# Patient Record
Sex: FEMALE | Race: BLACK OR AFRICAN AMERICAN | NOT HISPANIC OR LATINO | Employment: PART TIME | URBAN - METROPOLITAN AREA
[De-identification: names, ages, dates, MRNs, and addresses within clinical notes are randomized per-mention and may not be internally consistent; named-entity substitution may affect disease eponyms.]

---

## 2018-08-02 ENCOUNTER — HOSPITAL ENCOUNTER (EMERGENCY)
Facility: HOSPITAL | Age: 33
Discharge: HOME | End: 2018-08-03
Attending: EMERGENCY MEDICINE | Admitting: EMERGENCY MEDICINE
Payer: OTHER MISCELLANEOUS

## 2018-08-02 DIAGNOSIS — T14.8XXA ABRASION: Primary | ICD-10-CM

## 2018-08-02 LAB — ALT SERPL W P-5'-P-CCNC: 16 U/L (ref 12–78)

## 2018-08-02 PROCEDURE — 86803 HEPATITIS C AB TEST: CPT | Performed by: EMERGENCY MEDICINE

## 2018-08-02 PROCEDURE — 87389 HIV-1 AG W/HIV-1&-2 AB AG IA: CPT | Performed by: EMERGENCY MEDICINE

## 2018-08-02 PROCEDURE — 87340 HEPATITIS B SURFACE AG IA: CPT | Performed by: EMERGENCY MEDICINE

## 2018-08-02 PROCEDURE — 86706 HEP B SURFACE ANTIBODY: CPT | Performed by: EMERGENCY MEDICINE

## 2018-08-02 PROCEDURE — 36415 COLL VENOUS BLD VENIPUNCTURE: CPT | Performed by: EMERGENCY MEDICINE

## 2018-08-02 PROCEDURE — 84460 ALANINE AMINO (ALT) (SGPT): CPT | Performed by: EMERGENCY MEDICINE

## 2018-08-02 RX ORDER — EMTRICITABINE AND TENOFOVIR DISOPROXIL FUMARATE 200; 300 MG/1; MG/1
1 TABLET, FILM COATED ORAL DAILY
Qty: 10 TABLET | Refills: 0 | Status: SHIPPED | OUTPATIENT
Start: 2018-08-02 | End: 2019-04-01 | Stop reason: ALTCHOICE

## 2018-08-02 RX ADMIN — RALTEGRAVIR 400 MG: 400 TABLET, FILM COATED ORAL at 23:30

## 2018-08-03 ENCOUNTER — APPOINTMENT (OUTPATIENT)
Dept: LAB | Facility: HOSPITAL | Age: 33
End: 2018-08-03
Attending: PREVENTIVE MEDICINE
Payer: OTHER MISCELLANEOUS

## 2018-08-03 ENCOUNTER — TRANSCRIBE ORDERS (OUTPATIENT)
Dept: OCCUPATIONAL MEDICINE | Facility: CLINIC | Age: 33
End: 2018-08-03

## 2018-08-03 VITALS
TEMPERATURE: 98.1 F | OXYGEN SATURATION: 99 % | DIASTOLIC BLOOD PRESSURE: 82 MMHG | BODY MASS INDEX: 26.99 KG/M2 | SYSTOLIC BLOOD PRESSURE: 131 MMHG | RESPIRATION RATE: 20 BRPM | HEART RATE: 70 BPM | WEIGHT: 162 LBS | HEIGHT: 65 IN

## 2018-08-03 DIAGNOSIS — Z77.29: Primary | ICD-10-CM

## 2018-08-03 DIAGNOSIS — Z77.29: ICD-10-CM

## 2018-08-03 LAB
ALBUMIN SERPL BCP-MCNC: 3.9 G/DL (ref 3.5–5)
ALP SERPL-CCNC: 105 U/L (ref 46–116)
ALT SERPL W P-5'-P-CCNC: 16 U/L (ref 12–78)
AST SERPL W P-5'-P-CCNC: 14 U/L (ref 5–45)
BASOPHILS # BLD AUTO: 0.08 THOUSANDS/ΜL (ref 0–0.1)
BASOPHILS NFR BLD AUTO: 1 % (ref 0–1)
BILIRUB DIRECT SERPL-MCNC: 0.11 MG/DL (ref 0–0.2)
BILIRUB SERPL-MCNC: 0.4 MG/DL (ref 0.2–1)
BUN SERPL-MCNC: 9 MG/DL (ref 5–25)
CREAT SERPL-MCNC: 0.93 MG/DL (ref 0.6–1.3)
EOSINOPHIL # BLD AUTO: 0.2 THOUSAND/ΜL (ref 0–0.61)
EOSINOPHIL NFR BLD AUTO: 2 % (ref 0–6)
ERYTHROCYTE [DISTWIDTH] IN BLOOD BY AUTOMATED COUNT: 13.3 % (ref 11.6–15.1)
GFR SERPL CREATININE-BSD FRML MDRD: 93 ML/MIN/1.73SQ M
HBV SURFACE AB SER-ACNC: >1000 MIU/ML
HBV SURFACE AG SER QL: NORMAL
HCT VFR BLD AUTO: 40.2 % (ref 34.8–46.1)
HCV AB SER QL: NORMAL
HGB BLD-MCNC: 13.2 G/DL (ref 11.5–15.4)
HIV 1+2 AB+HIV1 P24 AG SERPL QL IA: NORMAL
IMM GRANULOCYTES # BLD AUTO: 0.01 THOUSAND/UL (ref 0–0.2)
IMM GRANULOCYTES NFR BLD AUTO: 0 % (ref 0–2)
LYMPHOCYTES # BLD AUTO: 3.56 THOUSANDS/ΜL (ref 0.6–4.47)
LYMPHOCYTES NFR BLD AUTO: 42 % (ref 14–44)
MCH RBC QN AUTO: 29.7 PG (ref 26.8–34.3)
MCHC RBC AUTO-ENTMCNC: 32.8 G/DL (ref 31.4–37.4)
MCV RBC AUTO: 91 FL (ref 82–98)
MONOCYTES # BLD AUTO: 0.85 THOUSAND/ΜL (ref 0.17–1.22)
MONOCYTES NFR BLD AUTO: 10 % (ref 4–12)
NEUTROPHILS # BLD AUTO: 3.79 THOUSANDS/ΜL (ref 1.85–7.62)
NEUTS SEG NFR BLD AUTO: 45 % (ref 43–75)
NRBC BLD AUTO-RTO: 0 /100 WBCS
PLATELET # BLD AUTO: 285 THOUSANDS/UL (ref 149–390)
PMV BLD AUTO: 10.4 FL (ref 8.9–12.7)
PROT SERPL-MCNC: 7.5 G/DL (ref 6.4–8.2)
RBC # BLD AUTO: 4.44 MILLION/UL (ref 3.81–5.12)
WBC # BLD AUTO: 8.49 THOUSAND/UL (ref 4.31–10.16)

## 2018-08-03 PROCEDURE — 80076 HEPATIC FUNCTION PANEL: CPT

## 2018-08-03 PROCEDURE — 82565 ASSAY OF CREATININE: CPT

## 2018-08-03 PROCEDURE — 84520 ASSAY OF UREA NITROGEN: CPT

## 2018-08-03 PROCEDURE — 99283 EMERGENCY DEPT VISIT LOW MDM: CPT

## 2018-08-03 PROCEDURE — 85025 COMPLETE CBC W/AUTO DIFF WBC: CPT

## 2018-08-03 PROCEDURE — 36415 COLL VENOUS BLD VENIPUNCTURE: CPT

## 2018-08-03 NOTE — ED PROVIDER NOTES
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, the customer is HIV positive     HPI    58-year-old female that presents today with body fluid exposure  Patient states she has a from assistant was giving medication to the customer which she knew was HIV positive  The customer scratch are in her right thumb she has a tiny scratch  States she is scared of getting HIV from the patient  Patient denies any symptoms  States she is fully vaccinated hepatitis is up-to-date tetanus is up-to-date  I discussed with patient this is a low risk nonsignificant exposure the patient is would like post exposure prophylaxis regimen for HIV because she knows the customer was HIV positive  I discussed about the risks I discussed about safe sexual activity  Discussed about symptoms  Discussed about breast-feeding contact here physician about discontinuing  Will have patient exposure panel checked right medications for 10 days and advised to follow up with occupational health    None       History reviewed  No pertinent past medical history  Past Surgical History:   Procedure Laterality Date    HYSTERECTOMY         History reviewed  No pertinent family history  I have reviewed and agree with the history as documented  Social History   Substance Use Topics    Smoking status: Never Smoker    Smokeless tobacco: Never Used    Alcohol use No        Review of Systems   Constitutional: Negative  Negative for diaphoresis and fever  HENT: Negative  Respiratory: Negative  Negative for cough, shortness of breath and wheezing  Cardiovascular: Negative  Negative for chest pain, palpitations and leg swelling  Gastrointestinal: Negative for abdominal distention, abdominal pain, nausea and vomiting  Genitourinary: Negative  Musculoskeletal: Negative  Skin: Negative  Neurological: Negative  Psychiatric/Behavioral: Negative  All other systems reviewed and are negative  Physical Exam  Physical Exam   Constitutional: She is oriented to person, place, and time  She appears well-developed and well-nourished  No distress  HENT:   Head: Normocephalic and atraumatic  Eyes: EOM are normal  Pupils are equal, round, and reactive to light  Neck: Normal range of motion  Neck supple  Cardiovascular: Normal rate, regular rhythm and normal heart sounds  No murmur heard  Pulmonary/Chest: Effort normal and breath sounds normal  No respiratory distress  Abdominal: Soft  Bowel sounds are normal  She exhibits no distension  There is no tenderness  There is no rebound and no guarding  Musculoskeletal: Normal range of motion  She exhibits no edema, tenderness or deformity  Superficial scratch to right thumb without any bleeding   Neurological: She is alert and oriented to person, place, and time  Skin: Skin is warm and dry  Capillary refill takes less than 2 seconds  No rash noted  She is not diaphoretic  Psychiatric: She has a normal mood and affect  Vitals reviewed        Vital Signs  ED Triage Vitals [08/02/18 2247]   Temperature Pulse Respirations Blood Pressure SpO2   98 1 °F (36 7 °C) 70 16 132/78 100 %      Temp Source Heart Rate Source Patient Position - Orthostatic VS BP Location FiO2 (%)   Oral Monitor Sitting Right arm --      Pain Score       --           Vitals:    08/02/18 2247 08/03/18 0010   BP: 132/78 131/82   Pulse: 70 70   Patient Position - Orthostatic VS: Sitting Sitting       Visual Acuity      ED Medications  Medications   emtricitabine-tenofovir (TRUVADA 200-300) combo dose (not administered)   raltegravir (ISENTRESS) tablet 400 mg (400 mg Oral Given 8/2/18 2330)       Diagnostic Studies  Results Reviewed     Procedure Component Value Units Date/Time    ALT [[id number]]  (Normal) Collected:  08/02/18 2322    Lab Status:  Final result Specimen: Blood from Arm, Right Updated:  08/02/18 2344     ALT 16 U/L     HIV 1/2 AG-AB combo [[id number]] Collected:  08/02/18 2322    Lab Status: In process Specimen:  Blood from Arm, Right Updated:  08/02/18 2329    Hepatitis B surface antigen [[id number]] Collected:  08/02/18 2322    Lab Status: In process Specimen:  Blood from Arm, Right Updated:  08/02/18 2329    Hepatitis C antibody [[id number]] Collected:  08/02/18 2322    Lab Status: In process Specimen:  Blood from Arm, Right Updated:  08/02/18 2329    Hepatitis B surface antibody [[id number]] Collected:  08/02/18 2322    Lab Status: In process Specimen:  Blood from Arm, Right Updated:  08/02/18 2329    POCT pregnancy, urine [[id number]]     Lab Status:  No result                  No orders to display              Procedures  Procedures       Phone Contacts  ED Phone Contact    ED Course  ED Course as of Aug 03 0055   Thu Aug 02, 2018   2348 Patient had a hysterectomy                                 MDM  CritCare Time    Disposition  Final diagnoses:   Abrasion     Time reflects when diagnosis was documented in both MDM as applicable and the Disposition within this note     Time User Action Codes Description Comment    8/2/2018 11:51 PM Lonza [place], [address] [place] [address] Road  8XXA] Abrasion       ED Disposition     ED Disposition Condition Comment    Discharge  [facility] discharge to home/self care      Condition at discharge: Stable        Follow-up Information     Follow up With Specialties Details Why Contact Info Additional Information    [address] [place] [address] Occupational Medicine Schedule an appointment as soon as possible for a visit  [address] [facility] [address] [place] [address]          Discharge Medication List as of 8/2/2018 11:55 PM      START taking these medications    Details   emtricitabine-tenofovir (TRUVADA) 200-300 mg per tablet Take 1 tablet by mouth daily for 10 days, Starting Thu 8/2/2018, Until Sun 8/12/2018, Print      raltegravir (ISENTRESS) 400 mg tablet Take 1 tablet (400 mg total) by mouth every 12 (twelve) hours for 10 days, Starting Thu 8/2/2018, Until Sun 8/12/2018, Print           No discharge procedures on file      ED Provider  Electronically Signed by           Erin Servin MD  08/03/18 9932

## 2018-08-03 NOTE — DISCHARGE INSTRUCTIONS
I discussed with you the risks of the medications  Advise safe sex  followup with Occupational Medicine  No breastfeeding  Do not donate blood or blood products  Monitor for fevers, rash, tiredness, swollen lymph nodes, flu-like symptoms, loss of appetite, nausea vomiting, stomach pains, yellowing of scan during the 6 months follow-up  Raltegravir (By mouth)   Raltegravir (fyn-ZNB-fm-vir)  Treats HIV infection  Brand Name(s): Isentress   There may be other brand names for this medicine  When This Medicine Should Not Be Used: This medicine is not right for everyone  Do not use if you had an allergic reaction to raltegravir  How to Use This Medicine:   Powder for Suspension, Tablet, Chewable Tablet  · Your doctor will tell you how much medicine to use  Do not use more than directed  · Chewable tablet: You may chew or swallow the tablet whole  · Film-coated tablet: Swallow the film-coated tablet whole  · Suspension:  ¨ Dissolve one single-use packet in 5 milliliters (mL) of water  ¨ Mix the medicine by swirling the mixing cup carefully and slowly in a [place]  Do not turn the cup upside down  ¨ Use the dosing syringe that comes with the package to measure the right dose  ¨ Take the dose within 30 minutes after you mix the liquid medicine  ¨ Throw away any remaining liquid medicine  · Do not change the dose or stop using this medicine without talking to your doctor first  Contact your doctor or pharmacist for a refill when your supply of this medicine is running low  · Read and follow the patient instructions that come with this medicine  Talk to your doctor or pharmacist if you have any questions  · Missed dose: Take a dose as soon as you remember  If it is almost time for your next dose, wait until then and take a regular dose  Do not take extra medicine to make up for a missed dose  · Store the medicine in a closed container at room temperature, away from heat, moisture, and direct light   Store the chewable tablets in the original package with the bottle tightly closed  Do not open the foil packet of powder for suspension until you are ready to use it  Drugs and Foods to Avoid:   Ask your doctor or pharmacist before using any other medicine, including over-the-counter medicines, vitamins, and herbal products  · Some medicines can affect how raltegravir works  Tell your doctor if you also use any of the following:  ¨ Rifampin  ¨ Zidovudine  ¨ Medicine to lower cholesterol  · Do not take this medicine together with an antacid that contains aluminum or magnesium  Wait at least 2 hours before or after you take raltegravir to take an antacid  Warnings While Using This Medicine:   · Tell your doctor if you are pregnant, or if you have liver disease, an infection, phenylketonuria (PKU), or a history of myopathy or rhabdomyolysis  · Do not breastfeed  You can spread HIV or AIDS to your baby through your breast milk  · This medicine may cause the following problems:   ¨ Severe skin or allergic reactions (may be life-threatening)  ¨ Myopathy  ¨ Rhabdomyolysis (which can cause kidney problems)  · Your immune system may get stronger when you start taking HIV medicine  Sometimes the immune system will start to fight an infection or other health problem, such as pneumonia or tuberculosis  Tell your doctor right away if you notice any changes in your health  · This medicine will not keep you from giving HIV to others  Always practice safe sex, even if your partner also has HIV  Do not share needles or other items that may have blood or body fluids on them  · Your doctor will do lab tests at regular visits to check on the effects of this medicine  Keep all appointments  · Keep all medicine out of the reach of children  Never share your medicine with anyone    Possible Side Effects While Using This Medicine:   Call your doctor right away if you notice any of these side effects:  · Allergic reaction: Itching or hives, swelling in your face or hands, swelling or tingling in your mouth or throat, chest tightness, trouble breathing  · Blistering, peeling, or severe skin rash  · Dark urine or pale stools, nausea, vomiting, loss of appetite, stomach pain, yellow skin or eyes  · Fever, chills, cough, sore throat, or joint pain  · Muscle pain, tenderness, or weakness  If you notice other side effects that you think are caused by this medicine, tell your doctor  Call your doctor for medical advice about side effects  You may report side effects to FDA at 3-417-FDA-6681  © 2017 [address] [employer] Information is for End User's use only and may not be sold, redistributed or otherwise used for commercial purposes  The above information is an  only  It is not intended as medical advice for individual conditions or treatments  Talk to your doctor, nurse or pharmacist before following any medical regimen to see if it is safe and effective for you

## 2018-08-10 ENCOUNTER — TRANSCRIBE ORDERS (OUTPATIENT)
Dept: OCCUPATIONAL MEDICINE | Facility: CLINIC | Age: 33
End: 2018-08-10

## 2018-08-10 DIAGNOSIS — Z77.29: Primary | ICD-10-CM

## 2018-08-28 ENCOUNTER — APPOINTMENT (OUTPATIENT)
Dept: LAB | Facility: HOSPITAL | Age: 33
End: 2018-08-28
Attending: PREVENTIVE MEDICINE

## 2018-08-28 DIAGNOSIS — Z77.29: ICD-10-CM

## 2018-08-28 LAB
ALBUMIN SERPL BCP-MCNC: 3.7 G/DL (ref 3.5–5)
ALP SERPL-CCNC: 103 U/L (ref 46–116)
ALT SERPL W P-5'-P-CCNC: 24 U/L (ref 12–78)
AST SERPL W P-5'-P-CCNC: 16 U/L (ref 5–45)
BASOPHILS # BLD AUTO: 0.09 THOUSANDS/ΜL (ref 0–0.1)
BASOPHILS NFR BLD AUTO: 1 % (ref 0–1)
BILIRUB DIRECT SERPL-MCNC: 0.09 MG/DL (ref 0–0.2)
BILIRUB SERPL-MCNC: 0.4 MG/DL (ref 0.2–1)
BUN SERPL-MCNC: 18 MG/DL (ref 5–25)
CREAT SERPL-MCNC: 1.03 MG/DL (ref 0.6–1.3)
EOSINOPHIL # BLD AUTO: 0.15 THOUSAND/ΜL (ref 0–0.61)
EOSINOPHIL NFR BLD AUTO: 2 % (ref 0–6)
ERYTHROCYTE [DISTWIDTH] IN BLOOD BY AUTOMATED COUNT: 13.5 % (ref 11.6–15.1)
GFR SERPL CREATININE-BSD FRML MDRD: 82 ML/MIN/1.73SQ M
HCT VFR BLD AUTO: 41.2 % (ref 34.8–46.1)
HGB BLD-MCNC: 13.5 G/DL (ref 11.5–15.4)
IMM GRANULOCYTES # BLD AUTO: 0.03 THOUSAND/UL (ref 0–0.2)
IMM GRANULOCYTES NFR BLD AUTO: 1 % (ref 0–2)
LYMPHOCYTES # BLD AUTO: 2.85 THOUSANDS/ΜL (ref 0.6–4.47)
LYMPHOCYTES NFR BLD AUTO: 43 % (ref 14–44)
MCH RBC QN AUTO: 29.7 PG (ref 26.8–34.3)
MCHC RBC AUTO-ENTMCNC: 32.8 G/DL (ref 31.4–37.4)
MCV RBC AUTO: 91 FL (ref 82–98)
MONOCYTES # BLD AUTO: 0.63 THOUSAND/ΜL (ref 0.17–1.22)
MONOCYTES NFR BLD AUTO: 10 % (ref 4–12)
NEUTROPHILS # BLD AUTO: 2.9 THOUSANDS/ΜL (ref 1.85–7.62)
NEUTS SEG NFR BLD AUTO: 43 % (ref 43–75)
NRBC BLD AUTO-RTO: 0 /100 WBCS
PLATELET # BLD AUTO: 303 THOUSANDS/UL (ref 149–390)
PMV BLD AUTO: 10.6 FL (ref 8.9–12.7)
PROT SERPL-MCNC: 7.3 G/DL (ref 6.4–8.2)
RBC # BLD AUTO: 4.54 MILLION/UL (ref 3.81–5.12)
WBC # BLD AUTO: 6.65 THOUSAND/UL (ref 4.31–10.16)

## 2018-08-28 PROCEDURE — 84520 ASSAY OF UREA NITROGEN: CPT

## 2018-08-28 PROCEDURE — 85025 COMPLETE CBC W/AUTO DIFF WBC: CPT

## 2018-08-28 PROCEDURE — 36415 COLL VENOUS BLD VENIPUNCTURE: CPT

## 2018-08-28 PROCEDURE — 80076 HEPATIC FUNCTION PANEL: CPT

## 2018-08-28 PROCEDURE — 82565 ASSAY OF CREATININE: CPT

## 2018-09-18 ENCOUNTER — APPOINTMENT (OUTPATIENT)
Dept: LAB | Facility: HOSPITAL | Age: 33
End: 2018-09-18
Attending: PREVENTIVE MEDICINE

## 2018-09-18 ENCOUNTER — APPOINTMENT (OUTPATIENT)
Dept: OCCUPATIONAL MEDICINE | Facility: CLINIC | Age: 33
End: 2018-09-18
Payer: OTHER MISCELLANEOUS

## 2018-09-18 ENCOUNTER — TRANSCRIBE ORDERS (OUTPATIENT)
Dept: OCCUPATIONAL MEDICINE | Facility: CLINIC | Age: 33
End: 2018-09-18

## 2018-09-18 DIAGNOSIS — Z77.21 EXPOSURE TO BLOOD-BORNE PATHOGEN: ICD-10-CM

## 2018-09-18 DIAGNOSIS — Z77.21 EXPOSURE TO BLOOD-BORNE PATHOGEN: Primary | ICD-10-CM

## 2018-09-18 LAB — ALT SERPL W P-5'-P-CCNC: 21 U/L (ref 12–78)

## 2018-09-18 PROCEDURE — 87522 HEPATITIS C REVRS TRNSCRPJ: CPT

## 2018-09-18 PROCEDURE — 36415 COLL VENOUS BLD VENIPUNCTURE: CPT

## 2018-09-18 PROCEDURE — 87389 HIV-1 AG W/HIV-1&-2 AB AG IA: CPT

## 2018-09-18 PROCEDURE — 87340 HEPATITIS B SURFACE AG IA: CPT

## 2018-09-18 PROCEDURE — 99213 OFFICE O/P EST LOW 20 MIN: CPT | Performed by: PREVENTIVE MEDICINE

## 2018-09-18 PROCEDURE — 86803 HEPATITIS C AB TEST: CPT

## 2018-09-18 PROCEDURE — 84460 ALANINE AMINO (ALT) (SGPT): CPT

## 2018-09-19 LAB
HBV SURFACE AG SER QL: NORMAL
HCV AB SER QL: NORMAL

## 2018-09-20 LAB
HCV RNA SERPL NAA+PROBE-ACNC: NORMAL IU/ML
HIV 1+2 AB+HIV1 P24 AG SERPL QL IA: NORMAL
TEST INFORMATION: NORMAL

## 2019-04-01 ENCOUNTER — OFFICE VISIT (OUTPATIENT)
Dept: OBGYN CLINIC | Age: 34
End: 2019-04-01
Payer: COMMERCIAL

## 2019-04-01 ENCOUNTER — TELEPHONE (OUTPATIENT)
Dept: OBGYN CLINIC | Facility: CLINIC | Age: 34
End: 2019-04-01

## 2019-04-01 VITALS
BODY MASS INDEX: 28.86 KG/M2 | WEIGHT: 173.2 LBS | HEIGHT: 65 IN | DIASTOLIC BLOOD PRESSURE: 64 MMHG | SYSTOLIC BLOOD PRESSURE: 90 MMHG

## 2019-04-01 DIAGNOSIS — N95.2 VAGINAL ATROPHY: ICD-10-CM

## 2019-04-01 DIAGNOSIS — R10.31 RIGHT INGUINAL PAIN: Primary | ICD-10-CM

## 2019-04-01 PROCEDURE — 99202 OFFICE O/P NEW SF 15 MIN: CPT | Performed by: NURSE PRACTITIONER

## 2019-04-01 RX ORDER — ERYTHROMYCIN AND BENZOYL PEROXIDE 30; 50 MG/G; MG/G
GEL TOPICAL 2 TIMES DAILY
COMMUNITY
End: 2019-08-09 | Stop reason: ALTCHOICE

## 2019-04-01 RX ORDER — DICYCLOMINE HYDROCHLORIDE 10 MG/1
10 CAPSULE ORAL 3 TIMES DAILY
Status: ON HOLD | COMMUNITY
End: 2019-04-30 | Stop reason: ALTCHOICE

## 2019-04-01 RX ORDER — TRETINOIN 0.5 MG/G
CREAM TOPICAL
COMMUNITY
End: 2019-05-25

## 2019-04-01 RX ORDER — SUMATRIPTAN 50 MG/1
50 TABLET, FILM COATED ORAL ONCE AS NEEDED
COMMUNITY

## 2019-04-01 RX ORDER — DOXYCYCLINE 100 MG/1
100 CAPSULE ORAL 2 TIMES DAILY
Status: ON HOLD | COMMUNITY
End: 2019-04-30 | Stop reason: ALTCHOICE

## 2019-04-01 RX ORDER — DOCUSATE SODIUM 100 MG/1
100 CAPSULE, LIQUID FILLED ORAL 2 TIMES DAILY PRN
COMMUNITY
End: 2019-05-25

## 2019-04-01 RX ORDER — ETODOLAC 400 MG/1
400 TABLET, FILM COATED ORAL 2 TIMES DAILY
COMMUNITY
End: 2019-05-25

## 2019-04-01 RX ORDER — NAPROXEN SODIUM 550 MG/1
550 TABLET ORAL 2 TIMES DAILY PRN
COMMUNITY
End: 2019-04-30 | Stop reason: HOSPADM

## 2019-04-04 ENCOUNTER — OFFICE VISIT (OUTPATIENT)
Dept: OBGYN CLINIC | Facility: CLINIC | Age: 34
End: 2019-04-04
Payer: COMMERCIAL

## 2019-04-04 ENCOUNTER — TRANSCRIBE ORDERS (OUTPATIENT)
Dept: ADMINISTRATIVE | Facility: HOSPITAL | Age: 34
End: 2019-04-04

## 2019-04-04 ENCOUNTER — HOSPITAL ENCOUNTER (OUTPATIENT)
Dept: ULTRASOUND IMAGING | Facility: CLINIC | Age: 34
Discharge: HOME | End: 2019-04-04
Payer: COMMERCIAL

## 2019-04-04 VITALS
SYSTOLIC BLOOD PRESSURE: 96 MMHG | BODY MASS INDEX: 28.92 KG/M2 | DIASTOLIC BLOOD PRESSURE: 64 MMHG | WEIGHT: 173.6 LBS | HEIGHT: 65 IN

## 2019-04-04 DIAGNOSIS — N95.2 VAGINAL ATROPHY: ICD-10-CM

## 2019-04-04 DIAGNOSIS — R10.31 ABDOMINAL PAIN, RIGHT LOWER QUADRANT: Primary | ICD-10-CM

## 2019-04-04 DIAGNOSIS — N95.2 VAGINAL ATROPHY: Primary | ICD-10-CM

## 2019-04-04 DIAGNOSIS — R10.31 RIGHT INGUINAL PAIN: ICD-10-CM

## 2019-04-04 PROCEDURE — 99212 OFFICE O/P EST SF 10 MIN: CPT | Performed by: NURSE PRACTITIONER

## 2019-04-04 PROCEDURE — 87510 GARDNER VAG DNA DIR PROBE: CPT | Performed by: NURSE PRACTITIONER

## 2019-04-04 PROCEDURE — 76856 US EXAM PELVIC COMPLETE: CPT

## 2019-04-04 PROCEDURE — 87660 TRICHOMONAS VAGIN DIR PROBE: CPT | Performed by: NURSE PRACTITIONER

## 2019-04-04 PROCEDURE — 87480 CANDIDA DNA DIR PROBE: CPT | Performed by: NURSE PRACTITIONER

## 2019-04-05 ENCOUNTER — OFFICE VISIT (OUTPATIENT)
Dept: OBGYN CLINIC | Facility: CLINIC | Age: 34
End: 2019-04-05
Payer: COMMERCIAL

## 2019-04-05 VITALS
BODY MASS INDEX: 28.86 KG/M2 | SYSTOLIC BLOOD PRESSURE: 102 MMHG | HEIGHT: 65 IN | WEIGHT: 173.25 LBS | DIASTOLIC BLOOD PRESSURE: 80 MMHG

## 2019-04-05 DIAGNOSIS — N80.9 ENDOMETRIOSIS: ICD-10-CM

## 2019-04-05 DIAGNOSIS — Z01.818 PREOPERATIVE TESTING: ICD-10-CM

## 2019-04-05 DIAGNOSIS — R10.2 PELVIC PAIN: Primary | ICD-10-CM

## 2019-04-05 PROCEDURE — 99214 OFFICE O/P EST MOD 30 MIN: CPT | Performed by: OBSTETRICS & GYNECOLOGY

## 2019-04-05 RX ORDER — SODIUM CHLORIDE, SODIUM LACTATE, POTASSIUM CHLORIDE, CALCIUM CHLORIDE 600; 310; 30; 20 MG/100ML; MG/100ML; MG/100ML; MG/100ML
125 INJECTION, SOLUTION INTRAVENOUS CONTINUOUS
Status: CANCELLED | OUTPATIENT
Start: 2019-04-05

## 2019-04-06 LAB
CANDIDA RRNA VAG QL PROBE: NEGATIVE
G VAGINALIS RRNA GENITAL QL PROBE: NEGATIVE
T VAGINALIS RRNA GENITAL QL PROBE: NEGATIVE

## 2019-04-08 ENCOUNTER — HOSPITAL ENCOUNTER (OUTPATIENT)
Dept: ULTRASOUND IMAGING | Facility: CLINIC | Age: 34
Discharge: HOME | End: 2019-04-08
Payer: COMMERCIAL

## 2019-04-08 DIAGNOSIS — R10.31 ABDOMINAL PAIN, RIGHT LOWER QUADRANT: ICD-10-CM

## 2019-04-08 PROCEDURE — 76830 TRANSVAGINAL US NON-OB: CPT

## 2019-04-09 ENCOUNTER — DOCUMENTATION (OUTPATIENT)
Dept: OBGYN CLINIC | Facility: CLINIC | Age: 34
End: 2019-04-09

## 2019-04-09 ENCOUNTER — TELEPHONE (OUTPATIENT)
Dept: OBGYN CLINIC | Facility: CLINIC | Age: 34
End: 2019-04-09

## 2019-04-09 DIAGNOSIS — R10.2 PELVIC PAIN: Primary | ICD-10-CM

## 2019-04-10 ENCOUNTER — TELEPHONE (OUTPATIENT)
Dept: OBGYN CLINIC | Facility: CLINIC | Age: 34
End: 2019-04-10

## 2019-04-16 ENCOUNTER — APPOINTMENT (OUTPATIENT)
Dept: LAB | Facility: HOSPITAL | Age: 34
End: 2019-04-16
Attending: OBSTETRICS & GYNECOLOGY
Payer: OTHER GOVERNMENT

## 2019-04-16 DIAGNOSIS — Z01.818 PREOPERATIVE TESTING: ICD-10-CM

## 2019-04-16 LAB
ABO GROUP BLD: NORMAL
ANION GAP SERPL CALCULATED.3IONS-SCNC: 9 MMOL/L (ref 4–13)
BASOPHILS # BLD AUTO: 0.09 THOUSANDS/ΜL (ref 0–0.1)
BASOPHILS NFR BLD AUTO: 1 % (ref 0–1)
BLD GP AB SCN SERPL QL: NEGATIVE
BUN SERPL-MCNC: 12 MG/DL (ref 5–25)
CALCIUM SERPL-MCNC: 9 MG/DL (ref 8.3–10.1)
CHLORIDE SERPL-SCNC: 107 MMOL/L (ref 100–108)
CO2 SERPL-SCNC: 26 MMOL/L (ref 21–32)
CREAT SERPL-MCNC: 0.89 MG/DL (ref 0.6–1.3)
EOSINOPHIL # BLD AUTO: 0.22 THOUSAND/ΜL (ref 0–0.61)
EOSINOPHIL NFR BLD AUTO: 3 % (ref 0–6)
ERYTHROCYTE [DISTWIDTH] IN BLOOD BY AUTOMATED COUNT: 14 % (ref 11.6–15.1)
GFR SERPL CREATININE-BSD FRML MDRD: 97 ML/MIN/1.73SQ M
GLUCOSE P FAST SERPL-MCNC: 83 MG/DL (ref 65–99)
HCT VFR BLD AUTO: 41.3 % (ref 34.8–46.1)
HGB BLD-MCNC: 13.6 G/DL (ref 11.5–15.4)
IMM GRANULOCYTES # BLD AUTO: 0.03 THOUSAND/UL (ref 0–0.2)
IMM GRANULOCYTES NFR BLD AUTO: 0 % (ref 0–2)
LYMPHOCYTES # BLD AUTO: 3.08 THOUSANDS/ΜL (ref 0.6–4.47)
LYMPHOCYTES NFR BLD AUTO: 35 % (ref 14–44)
MCH RBC QN AUTO: 30.2 PG (ref 26.8–34.3)
MCHC RBC AUTO-ENTMCNC: 32.9 G/DL (ref 31.4–37.4)
MCV RBC AUTO: 92 FL (ref 82–98)
MONOCYTES # BLD AUTO: 0.86 THOUSAND/ΜL (ref 0.17–1.22)
MONOCYTES NFR BLD AUTO: 10 % (ref 4–12)
NEUTROPHILS # BLD AUTO: 4.46 THOUSANDS/ΜL (ref 1.85–7.62)
NEUTS SEG NFR BLD AUTO: 51 % (ref 43–75)
NRBC BLD AUTO-RTO: 0 /100 WBCS
PLATELET # BLD AUTO: 303 THOUSANDS/UL (ref 149–390)
PMV BLD AUTO: 10.8 FL (ref 8.9–12.7)
POTASSIUM SERPL-SCNC: 3.6 MMOL/L (ref 3.5–5.3)
RBC # BLD AUTO: 4.51 MILLION/UL (ref 3.81–5.12)
RH BLD: POSITIVE
SODIUM SERPL-SCNC: 142 MMOL/L (ref 136–145)
SPECIMEN EXPIRATION DATE: NORMAL
WBC # BLD AUTO: 8.74 THOUSAND/UL (ref 4.31–10.16)

## 2019-04-16 PROCEDURE — 86901 BLOOD TYPING SEROLOGIC RH(D): CPT

## 2019-04-16 PROCEDURE — 80048 BASIC METABOLIC PNL TOTAL CA: CPT

## 2019-04-16 PROCEDURE — 86850 RBC ANTIBODY SCREEN: CPT

## 2019-04-16 PROCEDURE — 36415 COLL VENOUS BLD VENIPUNCTURE: CPT

## 2019-04-16 PROCEDURE — 85025 COMPLETE CBC W/AUTO DIFF WBC: CPT

## 2019-04-16 PROCEDURE — 86900 BLOOD TYPING SEROLOGIC ABO: CPT

## 2019-04-26 ENCOUNTER — TELEPHONE (OUTPATIENT)
Dept: OBGYN CLINIC | Age: 34
End: 2019-04-26

## 2019-04-26 RX ORDER — TRAMADOL HYDROCHLORIDE 50 MG/1
50 TABLET ORAL EVERY 6 HOURS PRN
Qty: 30 TABLET | Refills: 0 | Status: CANCELLED | OUTPATIENT
Start: 2019-04-26

## 2019-04-29 ENCOUNTER — ANESTHESIA EVENT (OUTPATIENT)
Dept: PERIOP | Facility: HOSPITAL | Age: 34
End: 2019-04-29
Payer: COMMERCIAL

## 2019-04-30 ENCOUNTER — ANESTHESIA (OUTPATIENT)
Dept: PERIOP | Facility: HOSPITAL | Age: 34
End: 2019-04-30
Payer: COMMERCIAL

## 2019-04-30 ENCOUNTER — HOSPITAL ENCOUNTER (OUTPATIENT)
Facility: HOSPITAL | Age: 34
Setting detail: OUTPATIENT SURGERY
Discharge: HOME | End: 2019-04-30
Attending: OBSTETRICS & GYNECOLOGY | Admitting: OBSTETRICS & GYNECOLOGY
Payer: COMMERCIAL

## 2019-04-30 VITALS
WEIGHT: 166.01 LBS | DIASTOLIC BLOOD PRESSURE: 58 MMHG | TEMPERATURE: 97.6 F | SYSTOLIC BLOOD PRESSURE: 105 MMHG | RESPIRATION RATE: 22 BRPM | BODY MASS INDEX: 27.66 KG/M2 | OXYGEN SATURATION: 100 % | HEIGHT: 65 IN | HEART RATE: 96 BPM

## 2019-04-30 DIAGNOSIS — R10.2 PELVIC PAIN: ICD-10-CM

## 2019-04-30 DIAGNOSIS — G89.18 POSTOPERATIVE PAIN: Primary | ICD-10-CM

## 2019-04-30 DIAGNOSIS — N80.9 ENDOMETRIOSIS: ICD-10-CM

## 2019-04-30 PROCEDURE — 88305 TISSUE EXAM BY PATHOLOGIST: CPT | Performed by: PATHOLOGY

## 2019-04-30 PROCEDURE — 58661 LAPAROSCOPY REMOVE ADNEXA: CPT | Performed by: OBSTETRICS & GYNECOLOGY

## 2019-04-30 RX ORDER — SENNOSIDES 8.6 MG
650 CAPSULE ORAL EVERY 8 HOURS PRN
Qty: 30 TABLET | Refills: 0 | Status: SHIPPED | OUTPATIENT
Start: 2019-04-30 | End: 2019-12-07 | Stop reason: ALTCHOICE

## 2019-04-30 RX ORDER — SCOLOPAMINE TRANSDERMAL SYSTEM 1 MG/1
PATCH, EXTENDED RELEASE TRANSDERMAL AS NEEDED
Status: DISCONTINUED | OUTPATIENT
Start: 2019-04-30 | End: 2019-04-30 | Stop reason: SURG

## 2019-04-30 RX ORDER — HYDROMORPHONE HCL/PF 1 MG/ML
0.5 SYRINGE (ML) INJECTION
Status: DISCONTINUED | OUTPATIENT
Start: 2019-04-30 | End: 2019-04-30 | Stop reason: HOSPADM

## 2019-04-30 RX ORDER — IBUPROFEN 600 MG/1
600 TABLET ORAL EVERY 6 HOURS PRN
Qty: 30 TABLET | Refills: 0 | Status: SHIPPED | OUTPATIENT
Start: 2019-04-30 | End: 2019-05-25

## 2019-04-30 RX ORDER — ONDANSETRON 2 MG/ML
4 INJECTION INTRAMUSCULAR; INTRAVENOUS ONCE AS NEEDED
Status: DISCONTINUED | OUTPATIENT
Start: 2019-04-30 | End: 2019-04-30 | Stop reason: HOSPADM

## 2019-04-30 RX ORDER — LIDOCAINE HYDROCHLORIDE AND EPINEPHRINE 10; 10 MG/ML; UG/ML
INJECTION, SOLUTION INFILTRATION; PERINEURAL AS NEEDED
Status: DISCONTINUED | OUTPATIENT
Start: 2019-04-30 | End: 2019-04-30 | Stop reason: HOSPADM

## 2019-04-30 RX ORDER — ROCURONIUM BROMIDE 10 MG/ML
INJECTION, SOLUTION INTRAVENOUS AS NEEDED
Status: DISCONTINUED | OUTPATIENT
Start: 2019-04-30 | End: 2019-04-30 | Stop reason: SURG

## 2019-04-30 RX ORDER — LIDOCAINE HYDROCHLORIDE 10 MG/ML
INJECTION, SOLUTION INFILTRATION; PERINEURAL AS NEEDED
Status: DISCONTINUED | OUTPATIENT
Start: 2019-04-30 | End: 2019-04-30 | Stop reason: SURG

## 2019-04-30 RX ORDER — FENTANYL CITRATE 50 UG/ML
INJECTION, SOLUTION INTRAMUSCULAR; INTRAVENOUS AS NEEDED
Status: DISCONTINUED | OUTPATIENT
Start: 2019-04-30 | End: 2019-04-30 | Stop reason: SURG

## 2019-04-30 RX ORDER — FENTANYL CITRATE/PF 50 MCG/ML
25 SYRINGE (ML) INJECTION
Status: DISCONTINUED | OUTPATIENT
Start: 2019-04-30 | End: 2019-04-30 | Stop reason: HOSPADM

## 2019-04-30 RX ORDER — OXYCODONE HYDROCHLORIDE 5 MG/1
5 TABLET ORAL EVERY 4 HOURS PRN
Qty: 5 TABLET | Refills: 0 | Status: SHIPPED | OUTPATIENT
Start: 2019-04-30 | End: 2019-05-10

## 2019-04-30 RX ORDER — KETOROLAC TROMETHAMINE 30 MG/ML
INJECTION, SOLUTION INTRAMUSCULAR; INTRAVENOUS AS NEEDED
Status: DISCONTINUED | OUTPATIENT
Start: 2019-04-30 | End: 2019-04-30 | Stop reason: SURG

## 2019-04-30 RX ORDER — MIDAZOLAM HYDROCHLORIDE 1 MG/ML
INJECTION INTRAMUSCULAR; INTRAVENOUS AS NEEDED
Status: DISCONTINUED | OUTPATIENT
Start: 2019-04-30 | End: 2019-04-30 | Stop reason: SURG

## 2019-04-30 RX ORDER — PROPOFOL 10 MG/ML
INJECTION, EMULSION INTRAVENOUS AS NEEDED
Status: DISCONTINUED | OUTPATIENT
Start: 2019-04-30 | End: 2019-04-30 | Stop reason: SURG

## 2019-04-30 RX ORDER — ONDANSETRON 2 MG/ML
INJECTION INTRAMUSCULAR; INTRAVENOUS AS NEEDED
Status: DISCONTINUED | OUTPATIENT
Start: 2019-04-30 | End: 2019-04-30 | Stop reason: SURG

## 2019-04-30 RX ORDER — OXYCODONE HYDROCHLORIDE 5 MG/1
5 TABLET ORAL EVERY 4 HOURS PRN
Status: DISCONTINUED | OUTPATIENT
Start: 2019-04-30 | End: 2019-04-30 | Stop reason: HOSPADM

## 2019-04-30 RX ORDER — DEXAMETHASONE SODIUM PHOSPHATE 10 MG/ML
INJECTION, SOLUTION INTRAMUSCULAR; INTRAVENOUS AS NEEDED
Status: DISCONTINUED | OUTPATIENT
Start: 2019-04-30 | End: 2019-04-30 | Stop reason: SURG

## 2019-04-30 RX ORDER — ACETAMINOPHEN 325 MG/1
650 TABLET ORAL EVERY 6 HOURS PRN
Status: DISCONTINUED | OUTPATIENT
Start: 2019-04-30 | End: 2019-04-30 | Stop reason: HOSPADM

## 2019-04-30 RX ORDER — SODIUM CHLORIDE, SODIUM LACTATE, POTASSIUM CHLORIDE, CALCIUM CHLORIDE 600; 310; 30; 20 MG/100ML; MG/100ML; MG/100ML; MG/100ML
125 INJECTION, SOLUTION INTRAVENOUS CONTINUOUS
Status: DISCONTINUED | OUTPATIENT
Start: 2019-04-30 | End: 2019-04-30

## 2019-04-30 RX ORDER — IBUPROFEN 600 MG/1
600 TABLET ORAL EVERY 6 HOURS PRN
Status: DISCONTINUED | OUTPATIENT
Start: 2019-04-30 | End: 2019-04-30 | Stop reason: HOSPADM

## 2019-04-30 RX ORDER — MAGNESIUM HYDROXIDE 1200 MG/15ML
LIQUID ORAL AS NEEDED
Status: DISCONTINUED | OUTPATIENT
Start: 2019-04-30 | End: 2019-04-30 | Stop reason: HOSPADM

## 2019-04-30 RX ORDER — SODIUM CHLORIDE, SODIUM LACTATE, POTASSIUM CHLORIDE, CALCIUM CHLORIDE 600; 310; 30; 20 MG/100ML; MG/100ML; MG/100ML; MG/100ML
125 INJECTION, SOLUTION INTRAVENOUS CONTINUOUS
Status: ACTIVE | OUTPATIENT
Start: 2019-04-30 | End: 2019-04-30

## 2019-04-30 RX ADMIN — SUGAMMADEX 100 MG: 100 INJECTION, SOLUTION INTRAVENOUS at 13:39

## 2019-04-30 RX ADMIN — FENTANYL CITRATE 100 MCG: 50 INJECTION, SOLUTION INTRAMUSCULAR; INTRAVENOUS at 13:01

## 2019-04-30 RX ADMIN — SODIUM CHLORIDE, SODIUM LACTATE, POTASSIUM CHLORIDE, AND CALCIUM CHLORIDE: .6; .31; .03; .02 INJECTION, SOLUTION INTRAVENOUS at 13:48

## 2019-04-30 RX ADMIN — ROCURONIUM BROMIDE 40 MG: 10 INJECTION, SOLUTION INTRAVENOUS at 13:01

## 2019-04-30 RX ADMIN — OXYCODONE HYDROCHLORIDE 5 MG: 5 TABLET ORAL at 15:24

## 2019-04-30 RX ADMIN — LIDOCAINE HYDROCHLORIDE 50 MG: 10 INJECTION, SOLUTION INFILTRATION; PERINEURAL at 13:01

## 2019-04-30 RX ADMIN — MIDAZOLAM HYDROCHLORIDE 2 MG: 1 INJECTION, SOLUTION INTRAMUSCULAR; INTRAVENOUS at 12:58

## 2019-04-30 RX ADMIN — PROPOFOL 140 MG: 10 INJECTION, EMULSION INTRAVENOUS at 13:01

## 2019-04-30 RX ADMIN — SODIUM CHLORIDE, SODIUM LACTATE, POTASSIUM CHLORIDE, AND CALCIUM CHLORIDE: .6; .31; .03; .02 INJECTION, SOLUTION INTRAVENOUS at 11:00

## 2019-04-30 RX ADMIN — SCOPALAMINE 1 PATCH: 1 PATCH, EXTENDED RELEASE TRANSDERMAL at 12:47

## 2019-04-30 RX ADMIN — KETOROLAC TROMETHAMINE 30 MG: 30 INJECTION, SOLUTION INTRAMUSCULAR at 13:36

## 2019-04-30 RX ADMIN — PHENYLEPHRINE HYDROCHLORIDE 100 MCG: 10 INJECTION INTRAVENOUS at 13:11

## 2019-04-30 RX ADMIN — FENTANYL CITRATE 25 MCG: 50 INJECTION, SOLUTION INTRAMUSCULAR; INTRAVENOUS at 14:12

## 2019-04-30 RX ADMIN — FENTANYL CITRATE 25 MCG: 50 INJECTION, SOLUTION INTRAMUSCULAR; INTRAVENOUS at 14:35

## 2019-04-30 RX ADMIN — ONDANSETRON 4 MG: 2 INJECTION INTRAMUSCULAR; INTRAVENOUS at 13:01

## 2019-04-30 RX ADMIN — PHENYLEPHRINE HYDROCHLORIDE 100 MCG: 10 INJECTION INTRAVENOUS at 13:06

## 2019-04-30 RX ADMIN — DEXAMETHASONE SODIUM PHOSPHATE 10 MG: 10 INJECTION, SOLUTION INTRAMUSCULAR; INTRAVENOUS at 13:01

## 2019-04-30 RX ADMIN — FENTANYL CITRATE 25 MCG: 50 INJECTION, SOLUTION INTRAMUSCULAR; INTRAVENOUS at 14:32

## 2019-05-04 ENCOUNTER — HOSPITAL ENCOUNTER (EMERGENCY)
Facility: HOSPITAL | Age: 34
Discharge: HOME | End: 2019-05-04
Attending: EMERGENCY MEDICINE | Admitting: EMERGENCY MEDICINE
Payer: OTHER GOVERNMENT

## 2019-05-04 VITALS
HEART RATE: 80 BPM | SYSTOLIC BLOOD PRESSURE: 108 MMHG | DIASTOLIC BLOOD PRESSURE: 62 MMHG | RESPIRATION RATE: 18 BRPM | TEMPERATURE: 97.9 F | OXYGEN SATURATION: 100 % | BODY MASS INDEX: 27.66 KG/M2 | HEIGHT: 65 IN | WEIGHT: 166.01 LBS

## 2019-05-04 DIAGNOSIS — G89.18 PAIN AT SURGICAL SITE: Primary | ICD-10-CM

## 2019-05-04 PROCEDURE — 99283 EMERGENCY DEPT VISIT LOW MDM: CPT

## 2019-05-04 PROCEDURE — 99283 EMERGENCY DEPT VISIT LOW MDM: CPT | Performed by: PHYSICIAN ASSISTANT

## 2019-05-04 RX ORDER — CEPHALEXIN 500 MG/1
500 CAPSULE ORAL 4 TIMES DAILY
Qty: 28 CAPSULE | Refills: 0 | Status: SHIPPED | OUTPATIENT
Start: 2019-05-04 | End: 2019-05-11

## 2019-05-04 RX ORDER — CEPHALEXIN 250 MG/1
500 CAPSULE ORAL ONCE
Status: COMPLETED | OUTPATIENT
Start: 2019-05-04 | End: 2019-05-04

## 2019-05-04 RX ORDER — CEPHALEXIN 500 MG/1
500 CAPSULE ORAL 4 TIMES DAILY
Qty: 28 CAPSULE | Refills: 0 | Status: SHIPPED | OUTPATIENT
Start: 2019-05-04 | End: 2019-05-04 | Stop reason: SDUPTHER

## 2019-05-04 RX ADMIN — CEPHALEXIN 500 MG: 250 CAPSULE ORAL at 15:23

## 2019-05-07 ENCOUNTER — TELEPHONE (OUTPATIENT)
Dept: OBGYN CLINIC | Facility: CLINIC | Age: 34
End: 2019-05-07

## 2019-05-21 DIAGNOSIS — N30.00 ACUTE CYSTITIS WITHOUT HEMATURIA: ICD-10-CM

## 2019-05-21 DIAGNOSIS — R23.2 HOT FLASHES: Primary | ICD-10-CM

## 2019-05-23 ENCOUNTER — TELEPHONE (OUTPATIENT)
Dept: OBGYN CLINIC | Facility: CLINIC | Age: 34
End: 2019-05-23

## 2019-05-24 ENCOUNTER — APPOINTMENT (OUTPATIENT)
Dept: LAB | Facility: HOSPITAL | Age: 34
End: 2019-05-24
Attending: OBSTETRICS & GYNECOLOGY
Payer: OTHER GOVERNMENT

## 2019-05-24 DIAGNOSIS — N30.00 ACUTE CYSTITIS WITHOUT HEMATURIA: ICD-10-CM

## 2019-05-24 LAB
BACTERIA UR QL AUTO: ABNORMAL /HPF
BILIRUB UR QL STRIP: NEGATIVE
CLARITY UR: CLEAR
COLOR UR: YELLOW
GLUCOSE UR STRIP-MCNC: NEGATIVE MG/DL
HGB UR QL STRIP.AUTO: NEGATIVE
KETONES UR STRIP-MCNC: NEGATIVE MG/DL
LEUKOCYTE ESTERASE UR QL STRIP: ABNORMAL
NITRITE UR QL STRIP: NEGATIVE
NON-SQ EPI CELLS URNS QL MICRO: ABNORMAL /HPF
PH UR STRIP.AUTO: 7 [PH]
PROT UR STRIP-MCNC: NEGATIVE MG/DL
RBC #/AREA URNS AUTO: ABNORMAL /HPF
SP GR UR STRIP.AUTO: 1.01 (ref 1–1.03)
UROBILINOGEN UR QL STRIP.AUTO: 0.2 E.U./DL
WBC #/AREA URNS AUTO: ABNORMAL /HPF

## 2019-05-24 PROCEDURE — 87077 CULTURE AEROBIC IDENTIFY: CPT

## 2019-05-24 PROCEDURE — 87186 SC STD MICRODIL/AGAR DIL: CPT

## 2019-05-24 PROCEDURE — 81001 URINALYSIS AUTO W/SCOPE: CPT | Performed by: OBSTETRICS & GYNECOLOGY

## 2019-05-24 PROCEDURE — 87086 URINE CULTURE/COLONY COUNT: CPT

## 2019-05-25 ENCOUNTER — HOSPITAL ENCOUNTER (EMERGENCY)
Facility: HOSPITAL | Age: 34
Discharge: HOME | End: 2019-05-25
Attending: EMERGENCY MEDICINE | Admitting: EMERGENCY MEDICINE
Payer: OTHER GOVERNMENT

## 2019-05-25 ENCOUNTER — APPOINTMENT (EMERGENCY)
Dept: RADIOLOGY | Facility: HOSPITAL | Age: 34
End: 2019-05-25
Payer: OTHER GOVERNMENT

## 2019-05-25 VITALS
OXYGEN SATURATION: 98 % | BODY MASS INDEX: 27.55 KG/M2 | SYSTOLIC BLOOD PRESSURE: 94 MMHG | TEMPERATURE: 101.6 F | DIASTOLIC BLOOD PRESSURE: 58 MMHG | HEART RATE: 99 BPM | HEIGHT: 65 IN | WEIGHT: 165.34 LBS | RESPIRATION RATE: 19 BRPM

## 2019-05-25 DIAGNOSIS — R50.9 FEVER: ICD-10-CM

## 2019-05-25 DIAGNOSIS — R05.9 COUGH: ICD-10-CM

## 2019-05-25 DIAGNOSIS — R68.89 FLU-LIKE SYMPTOMS: Primary | ICD-10-CM

## 2019-05-25 LAB
ALBUMIN SERPL BCP-MCNC: 4 G/DL (ref 3.5–5)
ALP SERPL-CCNC: 101 U/L (ref 46–116)
ALT SERPL W P-5'-P-CCNC: 22 U/L (ref 12–78)
ANION GAP SERPL CALCULATED.3IONS-SCNC: 13 MMOL/L (ref 4–13)
AST SERPL W P-5'-P-CCNC: 15 U/L (ref 5–45)
BASOPHILS # BLD AUTO: 0.09 THOUSANDS/ΜL (ref 0–0.1)
BASOPHILS NFR BLD AUTO: 1 % (ref 0–1)
BILIRUB DIRECT SERPL-MCNC: 0.09 MG/DL (ref 0–0.2)
BILIRUB SERPL-MCNC: 0.4 MG/DL (ref 0.2–1)
BUN SERPL-MCNC: 14 MG/DL (ref 5–25)
CALCIUM SERPL-MCNC: 9.2 MG/DL (ref 8.3–10.1)
CHLORIDE SERPL-SCNC: 106 MMOL/L (ref 100–108)
CO2 SERPL-SCNC: 21 MMOL/L (ref 21–32)
CREAT SERPL-MCNC: 1.22 MG/DL (ref 0.6–1.3)
EOSINOPHIL # BLD AUTO: 0.44 THOUSAND/ΜL (ref 0–0.61)
EOSINOPHIL NFR BLD AUTO: 4 % (ref 0–6)
ERYTHROCYTE [DISTWIDTH] IN BLOOD BY AUTOMATED COUNT: 13.2 % (ref 11.6–15.1)
GFR SERPL CREATININE-BSD FRML MDRD: 66 ML/MIN/1.73SQ M
GLUCOSE SERPL-MCNC: 101 MG/DL (ref 65–140)
HCT VFR BLD AUTO: 41.2 % (ref 34.8–46.1)
HGB BLD-MCNC: 13.6 G/DL (ref 11.5–15.4)
IMM GRANULOCYTES # BLD AUTO: 0.04 THOUSAND/UL (ref 0–0.2)
IMM GRANULOCYTES NFR BLD AUTO: 0 % (ref 0–2)
LYMPHOCYTES # BLD AUTO: 1.24 THOUSANDS/ΜL (ref 0.6–4.47)
LYMPHOCYTES NFR BLD AUTO: 12 % (ref 14–44)
MCH RBC QN AUTO: 30 PG (ref 26.8–34.3)
MCHC RBC AUTO-ENTMCNC: 33 G/DL (ref 31.4–37.4)
MCV RBC AUTO: 91 FL (ref 82–98)
MONOCYTES # BLD AUTO: 1.13 THOUSAND/ΜL (ref 0.17–1.22)
MONOCYTES NFR BLD AUTO: 11 % (ref 4–12)
NEUTROPHILS # BLD AUTO: 7.23 THOUSANDS/ΜL (ref 1.85–7.62)
NEUTS SEG NFR BLD AUTO: 72 % (ref 43–75)
NRBC BLD AUTO-RTO: 0 /100 WBCS
PLATELET # BLD AUTO: 276 THOUSANDS/UL (ref 149–390)
PMV BLD AUTO: 10.5 FL (ref 8.9–12.7)
POTASSIUM SERPL-SCNC: 3.2 MMOL/L (ref 3.5–5.3)
PROT SERPL-MCNC: 8.1 G/DL (ref 6.4–8.2)
RBC # BLD AUTO: 4.53 MILLION/UL (ref 3.81–5.12)
SODIUM SERPL-SCNC: 140 MMOL/L (ref 136–145)
TROPONIN I SERPL-MCNC: <0.02 NG/ML
WBC # BLD AUTO: 10.17 THOUSAND/UL (ref 4.31–10.16)

## 2019-05-25 PROCEDURE — 93005 ELECTROCARDIOGRAM TRACING: CPT

## 2019-05-25 PROCEDURE — 36415 COLL VENOUS BLD VENIPUNCTURE: CPT | Performed by: EMERGENCY MEDICINE

## 2019-05-25 PROCEDURE — 99284 EMERGENCY DEPT VISIT MOD MDM: CPT

## 2019-05-25 PROCEDURE — 93010 ELECTROCARDIOGRAM REPORT: CPT | Performed by: INTERNAL MEDICINE

## 2019-05-25 PROCEDURE — 94640 AIRWAY INHALATION TREATMENT: CPT

## 2019-05-25 PROCEDURE — 80048 BASIC METABOLIC PNL TOTAL CA: CPT | Performed by: EMERGENCY MEDICINE

## 2019-05-25 PROCEDURE — 96374 THER/PROPH/DIAG INJ IV PUSH: CPT

## 2019-05-25 PROCEDURE — 84484 ASSAY OF TROPONIN QUANT: CPT | Performed by: EMERGENCY MEDICINE

## 2019-05-25 PROCEDURE — 96361 HYDRATE IV INFUSION ADD-ON: CPT

## 2019-05-25 PROCEDURE — 85025 COMPLETE CBC W/AUTO DIFF WBC: CPT | Performed by: EMERGENCY MEDICINE

## 2019-05-25 PROCEDURE — 80076 HEPATIC FUNCTION PANEL: CPT | Performed by: EMERGENCY MEDICINE

## 2019-05-25 PROCEDURE — 99284 EMERGENCY DEPT VISIT MOD MDM: CPT | Performed by: EMERGENCY MEDICINE

## 2019-05-25 PROCEDURE — 71046 X-RAY EXAM CHEST 2 VIEWS: CPT

## 2019-05-25 RX ORDER — BENZONATATE 100 MG/1
100 CAPSULE ORAL ONCE
Status: COMPLETED | OUTPATIENT
Start: 2019-05-25 | End: 2019-05-25

## 2019-05-25 RX ORDER — ONDANSETRON 2 MG/ML
4 INJECTION INTRAMUSCULAR; INTRAVENOUS ONCE
Status: COMPLETED | OUTPATIENT
Start: 2019-05-25 | End: 2019-05-25

## 2019-05-25 RX ORDER — ACETAMINOPHEN 325 MG/1
975 TABLET ORAL ONCE
Status: COMPLETED | OUTPATIENT
Start: 2019-05-25 | End: 2019-05-25

## 2019-05-25 RX ORDER — ONDANSETRON 4 MG/1
4 TABLET, ORALLY DISINTEGRATING ORAL EVERY 8 HOURS PRN
Qty: 20 TABLET | Refills: 0 | Status: SHIPPED | OUTPATIENT
Start: 2019-05-25 | End: 2019-12-07 | Stop reason: ALTCHOICE

## 2019-05-25 RX ORDER — ALBUTEROL SULFATE 2.5 MG/3ML
5 SOLUTION RESPIRATORY (INHALATION) ONCE
Status: COMPLETED | OUTPATIENT
Start: 2019-05-25 | End: 2019-05-25

## 2019-05-25 RX ORDER — PREDNISONE 20 MG/1
60 TABLET ORAL DAILY
Qty: 15 TABLET | Refills: 0 | Status: SHIPPED | OUTPATIENT
Start: 2019-05-25 | End: 2019-05-30

## 2019-05-25 RX ORDER — ALBUTEROL SULFATE 90 UG/1
2 AEROSOL, METERED RESPIRATORY (INHALATION) EVERY 4 HOURS PRN
Qty: 1 INHALER | Refills: 0 | Status: SHIPPED | OUTPATIENT
Start: 2019-05-25 | End: 2019-08-09 | Stop reason: ALTCHOICE

## 2019-05-25 RX ORDER — BENZONATATE 100 MG/1
100 CAPSULE ORAL EVERY 8 HOURS
Qty: 21 CAPSULE | Refills: 0 | Status: SHIPPED | OUTPATIENT
Start: 2019-05-25 | End: 2019-08-09 | Stop reason: ALTCHOICE

## 2019-05-25 RX ADMIN — ACETAMINOPHEN 975 MG: 325 TABLET, FILM COATED ORAL at 14:27

## 2019-05-25 RX ADMIN — ALBUTEROL SULFATE 5 MG: 2.5 SOLUTION RESPIRATORY (INHALATION) at 13:35

## 2019-05-25 RX ADMIN — ONDANSETRON 4 MG: 2 INJECTION INTRAMUSCULAR; INTRAVENOUS at 13:34

## 2019-05-25 RX ADMIN — BENZONATATE 100 MG: 100 CAPSULE ORAL at 13:33

## 2019-05-25 RX ADMIN — SODIUM CHLORIDE 1000 ML: 0.9 INJECTION, SOLUTION INTRAVENOUS at 13:28

## 2019-05-26 LAB — BACTERIA UR CULT: ABNORMAL

## 2019-05-28 ENCOUNTER — TELEPHONE (OUTPATIENT)
Dept: OBGYN CLINIC | Facility: CLINIC | Age: 34
End: 2019-05-28

## 2019-05-28 ENCOUNTER — OFFICE VISIT (OUTPATIENT)
Dept: OBGYN CLINIC | Facility: CLINIC | Age: 34
End: 2019-05-28

## 2019-05-28 VITALS
DIASTOLIC BLOOD PRESSURE: 80 MMHG | WEIGHT: 166.13 LBS | SYSTOLIC BLOOD PRESSURE: 122 MMHG | HEIGHT: 65 IN | BODY MASS INDEX: 27.68 KG/M2

## 2019-05-28 DIAGNOSIS — R23.2 HOT FLASHES: ICD-10-CM

## 2019-05-28 DIAGNOSIS — Z09 POSTOPERATIVE EXAMINATION: Primary | ICD-10-CM

## 2019-05-28 DIAGNOSIS — N30.00 ACUTE CYSTITIS WITHOUT HEMATURIA: ICD-10-CM

## 2019-05-28 PROBLEM — R10.2 PELVIC PAIN: Status: RESOLVED | Noted: 2019-04-05 | Resolved: 2019-05-28

## 2019-05-28 PROBLEM — R10.31 RIGHT INGUINAL PAIN: Status: RESOLVED | Noted: 2019-04-01 | Resolved: 2019-05-28

## 2019-05-28 PROCEDURE — 99024 POSTOP FOLLOW-UP VISIT: CPT | Performed by: OBSTETRICS & GYNECOLOGY

## 2019-05-28 RX ORDER — SULFAMETHOXAZOLE AND TRIMETHOPRIM 800; 160 MG/1; MG/1
1 TABLET ORAL EVERY 12 HOURS SCHEDULED
Qty: 14 TABLET | Refills: 0 | Status: SHIPPED | OUTPATIENT
Start: 2019-05-28 | End: 2019-06-04

## 2019-05-29 LAB
ATRIAL RATE: 107 BPM
P AXIS: 45 DEGREES
PR INTERVAL: 150 MS
QRS AXIS: 51 DEGREES
QRSD INTERVAL: 62 MS
QT INTERVAL: 310 MS
QTC INTERVAL: 413 MS
T WAVE AXIS: -23 DEGREES
VENTRICULAR RATE: 107 BPM

## 2019-06-04 ENCOUNTER — TELEPHONE (OUTPATIENT)
Dept: OBGYN CLINIC | Facility: CLINIC | Age: 34
End: 2019-06-04

## 2019-07-19 ENCOUNTER — TELEPHONE (OUTPATIENT)
Dept: OBGYN CLINIC | Facility: CLINIC | Age: 34
End: 2019-07-19

## 2019-07-19 NOTE — TELEPHONE ENCOUNTER
Pt has been using the premarin cream and tablets, she said she feel nauseas  after taking the premarin tablets, and still feels dry all the time, advised using coconut oil in between the applications, she said it still wont work, she doesn't know what to do, please advise

## 2019-07-19 NOTE — TELEPHONE ENCOUNTER
Instead of premarin tablets would she like to try a patch instead? In stead of vaginal cream would she like to try a vaginal ring - not  Sure of cost though

## 2019-07-22 NOTE — TELEPHONE ENCOUNTER
She is young and should be on some type of systemic estrogen  If not PO, she would need a patch  I would maybe try that first and see if it clears up the nausea  I would have her check with her insurance about whether or not the Estring is covered before we change her Rx

## 2019-07-22 NOTE — TELEPHONE ENCOUNTER
Pt had hyster and 2 mos ago had bilat oophorectomy  Now takes 0 625 of premarin daily and josh vag cream 1 gm tid  Was taking pill at same time as she put cream in and was nauseated  She will switch time of pill  Pt has "irritation" inside of vagina  It comes and goes  Not just with sex  She had this prior to oophorectomy  I tried to give her OV - could not accommodate her  KTM suggested vag ring? May I order her estring? What about oral premarin? Should she stay on it? ?   Thanks

## 2019-07-23 NOTE — TELEPHONE ENCOUNTER
Spoke with pt - she would like to try the patch  States she knows it will be covered under the [place]  She would like the script to be printed so it could be mailed to her  Please advise  Thanks!

## 2019-07-24 DIAGNOSIS — Z78.0 MENOPAUSE: Primary | ICD-10-CM

## 2019-07-24 RX ORDER — ESTRADIOL 0.05 MG/D
1 FILM, EXTENDED RELEASE TRANSDERMAL 2 TIMES WEEKLY
Qty: 8 PATCH | Refills: 11 | Status: SHIPPED | OUTPATIENT
Start: 2019-07-25 | End: 2019-08-09

## 2019-08-09 ENCOUNTER — OFFICE VISIT (OUTPATIENT)
Dept: OBGYN CLINIC | Facility: CLINIC | Age: 34
End: 2019-08-09
Payer: COMMERCIAL

## 2019-08-09 VITALS — WEIGHT: 164.8 LBS | DIASTOLIC BLOOD PRESSURE: 70 MMHG | BODY MASS INDEX: 27.42 KG/M2 | SYSTOLIC BLOOD PRESSURE: 110 MMHG

## 2019-08-09 DIAGNOSIS — Z11.3 SCREENING FOR STD (SEXUALLY TRANSMITTED DISEASE): ICD-10-CM

## 2019-08-09 DIAGNOSIS — N89.8 VAGINAL DISCHARGE: Primary | ICD-10-CM

## 2019-08-09 DIAGNOSIS — N95.2 VAGINAL ATROPHY: ICD-10-CM

## 2019-08-09 PROCEDURE — 99213 OFFICE O/P EST LOW 20 MIN: CPT | Performed by: NURSE PRACTITIONER

## 2019-08-09 PROCEDURE — 87491 CHLMYD TRACH DNA AMP PROBE: CPT | Performed by: NURSE PRACTITIONER

## 2019-08-09 PROCEDURE — 87661 TRICHOMONAS VAGINALIS AMPLIF: CPT | Performed by: NURSE PRACTITIONER

## 2019-08-09 PROCEDURE — 87591 N.GONORRHOEAE DNA AMP PROB: CPT | Performed by: NURSE PRACTITIONER

## 2019-08-09 RX ORDER — ESTRADIOL 0.04 MG/D
1 PATCH TRANSDERMAL WEEKLY
Qty: 4 PATCH | Refills: 11 | Status: CANCELLED | OUTPATIENT
Start: 2019-08-09

## 2019-08-09 RX ORDER — CLOTRIMAZOLE 1 %
CREAM (GRAM) TOPICAL 2 TIMES DAILY
Qty: 30 G | Refills: 0 | Status: SHIPPED | OUTPATIENT
Start: 2019-08-09 | End: 2020-03-24

## 2019-08-09 RX ORDER — FLUCONAZOLE 150 MG/1
TABLET ORAL
Qty: 2 TABLET | Refills: 0 | Status: SHIPPED | OUTPATIENT
Start: 2019-08-09 | End: 2019-08-12

## 2019-08-10 PROBLEM — Z11.3 SCREENING FOR STD (SEXUALLY TRANSMITTED DISEASE): Status: ACTIVE | Noted: 2019-08-10

## 2019-08-10 PROBLEM — N89.8 VAGINAL DISCHARGE: Status: ACTIVE | Noted: 2019-08-10

## 2019-08-10 NOTE — PATIENT INSTRUCTIONS
Vaginal Atrophy   WHAT YOU NEED TO KNOW:   What is vaginal atrophy? Vaginal atrophy is a condition that causes thinning, drying, and inflammation of vaginal tissue  This condition is caused by decreased levels of estrogen (a female sex hormone)  Vaginal atrophy can increase your risk for vaginal and urinary tract infections  Vaginal atrophy can worsen over time if not treated  What causes or increases your risk of vaginal atrophy? · Menopause     · Medicines that lower your estrogen levels, such as those used to treat breast cancer, endometriosis, or fibroids    · Radiation to your pelvic area     · Surgery to remove the ovaries    · Breastfeeding  What are the signs and symptoms of vaginal atrophy? · Vaginal dryness, itching, and burning    · Vaginal discharge    · Pain or discomfort during sex    · Light bleeding after sex    · Burning during urination    · Frequent, sudden, strong urges to urinate    · Urinary incontinence (loss of control of your bladder)  How is vaginal atrophy diagnosed? Your healthcare provider will ask about your symptoms  A pelvic exam will be done to examine your vagina and cervix  Your healthcare provider will place a speculum into your vagina to open and examine it  A sample of discharge from your vagina may be collected and tested  A urine test may also be done  How is vaginal atrophy treated? · Over-the counter vaginal moisturizers  can help reduce dryness  Your healthcare provider may recommend that you use a vaginal moisturizer several times each week and during sex  Only use creams that are made for vaginal use  Do  not  use petroleum jelly  Lubricants can be used during sex to decrease pain and discomfort  · Estrogen  may help decrease dryness  It may also lower your risk of vaginal infections if you are going through menopause  It can also help to relieve urinary symptoms  Estrogen may be prescribed in the form of a cream, tablet, or ring   These medicines can be applied or inserted into the vagina  Estrogen can also be prescribed in the form of a pill  When should I contact my healthcare provider? · You have a foul-smelling odor coming from your vagina  · You have a thick, cheese-like discharge from your vagina  · You have itching, swelling, or redness in your vagina  · You have pain or burning when you urinate  · Your urine smells bad  · Your symptoms do not improve, or they get worse  · You have questions or concerns about your condition or care  CARE AGREEMENT:   You have the right to help plan your care  Learn about your health condition and how it may be treated  Discuss treatment options with your caregivers to decide what care you want to receive  You always have the right to refuse treatment  The above information is an  only  It is not intended as medical advice for individual conditions or treatments  Talk to your doctor, nurse or pharmacist before following any medical regimen to see if it is safe and effective for you  © 2017 [employer] Information is for End User's use only and may not be sold, redistributed or otherwise used for commercial purposes  All illustrations and images included in CareNotes® are the copyrighted property of A D A M [employer] Inc  or David [employer]

## 2019-08-10 NOTE — PROGRESS NOTES
Assessment/Plan:    No problem-specific Assessment & Plan notes found for this encounter  Diagnoses and all orders for this visit:    1  Vaginal discharge  -     Trichomonas Vaginalis, WALKER  -     clotrimazole (LOTRIMIN) 1 % cream; Apply topically 2 (two) times a day for 14 days  -     fluconazole (DIFLUCAN) 150 mg tablet; Take one tablet today and in 3 days take 2nd tablet  Reviewed vaginal hygiene, no douching, excessive feminine products, scented soaps and lotions  Keep vaginal area cool and dry  Drink lots of water and watch sugar intake  Can try a probiotic for vaginal hoda health  Call if symptoms persists or gets worse  2  Vaginal atrophy  -     conjugated estrogens (PREMARIN) vaginal cream; Insert 1 5 g into the vagina 3 (three) times a week  Also reviewed that some vulvar/vaginal symptoms maybe r/t atrophy  Wanted the patch, but reviewed that this may not help with vaginal atrophy and pain like the vaginal cream will  3  Screening for STD (sexually transmitted disease)  -     HIV 1/2 AG-AB combo; Future  -     Hepatitis B surface antigen; Future  -     Hepatitis C antibody; Future  -     RPR; Future  -     Chlamydia/GC amplified DNA by PCR    Other orders  -     estradiol (CLIMARA) 0 0375 MG/24HR; Place 1 patch on the skin once a week          Subjective:      Patient ID: Serge Simmonds is a 26 y o  female  HPI  Pleasant 26 y o female presents for STD testing and refill for vaginal atrophy treatment  She is requesting vaginal cultures and blood work testing  She denies any vaginal symptoms, except for some external vulvar itching  She denies starting any new soaps, lotions, detergents,  No recent ABX  Denies F/C/N/V  She want to discuss different options for her vaginal atrophy and treatment that would be covered by her [employer]  Has not been using cream as regular as she was told because of the insurance issues         The following portions of the patient's history were reviewed and updated as appropriate: allergies, current medications, past family history, past medical history, past social history, past surgical history and problem list     Review of Systems   Constitutional: Negative for chills, fatigue, fever and unexpected weight change  Cardiovascular: Negative for chest pain and leg swelling  Gastrointestinal: Negative for constipation, diarrhea, nausea and vomiting  Genitourinary: Negative for dysuria, frequency, menstrual problem, pelvic pain, vaginal bleeding and vaginal discharge  Neurological: Negative for headaches  Objective:      /70 (BP Location: Left arm, Patient Position: Sitting, Cuff Size: Standard)   Wt 74 8 kg (164 lb 12 8 oz)   LMP  (LMP Unknown)   BMI 27 42 kg/m²          Physical Exam   Constitutional: She is oriented to person, place, and time  She appears well-developed and well-nourished  Cardiovascular: Normal rate and regular rhythm  Pulmonary/Chest: Effort normal and breath sounds normal    Abdominal: Soft  Genitourinary: Uterus normal  There is no rash or tenderness on the right labia  There is no rash or tenderness on the left labia  No tenderness in the vagina  Vaginal discharge (small amount of thick, white) found  Neurological: She is alert and oriented to person, place, and time  Skin: Skin is warm and dry  Psychiatric: She has a normal mood and affect  Her behavior is normal    Nursing note and vitals reviewed

## 2019-08-12 LAB
C TRACH DNA SPEC QL NAA+PROBE: NEGATIVE
N GONORRHOEA DNA SPEC QL NAA+PROBE: NEGATIVE

## 2019-08-14 LAB — T VAGINALIS RRNA SPEC QL NAA+PROBE: NEGATIVE

## 2019-08-16 ENCOUNTER — APPOINTMENT (OUTPATIENT)
Dept: LAB | Facility: HOSPITAL | Age: 34
End: 2019-08-16
Payer: COMMERCIAL

## 2019-08-16 DIAGNOSIS — Z11.3 SCREENING FOR STD (SEXUALLY TRANSMITTED DISEASE): ICD-10-CM

## 2019-08-16 PROCEDURE — 87340 HEPATITIS B SURFACE AG IA: CPT

## 2019-08-16 PROCEDURE — 86592 SYPHILIS TEST NON-TREP QUAL: CPT

## 2019-08-16 PROCEDURE — 86803 HEPATITIS C AB TEST: CPT

## 2019-08-16 PROCEDURE — 36415 COLL VENOUS BLD VENIPUNCTURE: CPT

## 2019-08-16 PROCEDURE — 87389 HIV-1 AG W/HIV-1&-2 AB AG IA: CPT

## 2019-08-17 LAB
HBV SURFACE AG SER QL: NORMAL
HCV AB SER QL: NORMAL

## 2019-08-18 LAB — HIV 1+2 AB+HIV1 P24 AG SERPL QL IA: NORMAL

## 2019-08-19 LAB — RPR SER QL: NORMAL

## 2019-09-09 ENCOUNTER — DOCUMENTATION (OUTPATIENT)
Dept: OBGYN CLINIC | Facility: CLINIC | Age: 34
End: 2019-09-09

## 2019-09-09 NOTE — PROGRESS NOTES
Certified letter that was mailed to the patient 07/24/19 was returned to the office as "unclaimed"  Letter was a prescription for the estradiol patch  Tried to call pt - robe full   -KM

## 2019-10-09 ENCOUNTER — TRANSCRIBE ORDERS (OUTPATIENT)
Dept: MRI IMAGING | Facility: CLINIC | Age: 34
End: 2019-10-09

## 2019-12-06 ENCOUNTER — OFFICE VISIT (OUTPATIENT)
Dept: OBGYN CLINIC | Facility: CLINIC | Age: 34
End: 2019-12-06
Payer: COMMERCIAL

## 2019-12-06 VITALS
DIASTOLIC BLOOD PRESSURE: 52 MMHG | BODY MASS INDEX: 26.74 KG/M2 | WEIGHT: 160.5 LBS | SYSTOLIC BLOOD PRESSURE: 120 MMHG | HEIGHT: 65 IN

## 2019-12-06 DIAGNOSIS — E89.41 HOT FLASHES DUE TO SURGICAL MENOPAUSE: Primary | ICD-10-CM

## 2019-12-06 DIAGNOSIS — M79.604 PAIN IN BOTH LOWER EXTREMITIES: ICD-10-CM

## 2019-12-06 DIAGNOSIS — S80.10XA CONTUSION OF LOWER LEG, UNSPECIFIED LATERALITY, INITIAL ENCOUNTER: ICD-10-CM

## 2019-12-06 DIAGNOSIS — M79.605 PAIN IN BOTH LOWER EXTREMITIES: ICD-10-CM

## 2019-12-06 PROCEDURE — 99213 OFFICE O/P EST LOW 20 MIN: CPT | Performed by: NURSE PRACTITIONER

## 2019-12-06 NOTE — PATIENT INSTRUCTIONS
Calcium/Vitamin D Supplement (By mouth)   Calcium (LUIS-see-um), Vitamin D (VYE-ta-min D)  Supplies your body with calcium if you need more than you get in your diet  Calcium helps prevent osteoporosis (weak or brittle bones)  Vitamin D helps your body use the calcium  Calcium and vitamin D are minerals that your body needs to work properly  Brand Name(s): Rudy-Citrate, Rudy-Citrate Plus Vitamin D, Calcet Petites, Calcium 600MG+D, Calcium Citrate +D3 Maximum, Caltrate 600 + D, Citracal + D, Citracal Calcium Citrate Petites with Vitamin D, Citracal Petites, Citracal Ultradense Calcium Citrate, Citracal Ultradense Calcium Citrate Petite w/Vit D, Citrus Calcium with Vitamin D, D-1000, D-2000, D3-400IU   There may be other brand names for this medicine  When This Medicine Should Not Be Used: You should not use this medicine if you have had an allergic reaction to calcium or vitamin D (ergocalciferol)  How to Use This Medicine:   Tablet, Long Acting Tablet, Fizzy Tablet, Liquid Filled Capsule, Chewable Tablet  · Your doctor will tell you how much medicine to use  Do not use more than directed  · Follow the instructions on the medicine label if you are using this medicine without a prescription  Ask your pharmacist or health caregiver if you are not sure how much calcium you should take in one day  · Most calcium supplements should be taken with food, but some kinds of calcium (such as calcium citrate) can be taken with or without food  Ask your health care provider or read the label on the bottle to see if you need to take your specific kind of calcium with food  Drink a full glass of water (8 ounces) with each dose  · If you are using the effervescent (fizzy) tablet, dissolve the tablet in about 6 to 8 ounces of water (3/4 cup to 1 cup)  After the tablet is completely dissolved, drink this mixture right away  Do not save any mixture to take later    · Carefully follow your doctor's instructions about any special diet   · If you need to take more than one dose each a day, take each dose at evenly spaced times, unless your doctor has told you otherwise  If a dose is missed:   · Take a dose as soon as you remember  If it is almost time for your next dose, wait until then and take a regular dose  Do not take extra medicine to make up for a missed dose  How to Store and Dispose of This Medicine:   · Store the medicine in a closed container at room temperature, away from heat, moisture, and direct light  If the effervescent (fizzy) tablet comes packaged in foil, do not open the foil until you are ready to use each tablet  · Ask your pharmacist, doctor, or health caregiver about the best way to dispose of any outdated medicine or medicine no longer needed  · Keep all medicine out of the reach of children  Never share your medicine with anyone  Drugs and Foods to Avoid:   Ask your doctor or pharmacist before using any other medicine, including over-the-counter medicines, vitamins, and herbal products  · Make sure your doctor knows if you are also using other supplements or medicines that contain calcium  Tell your doctor if you are also using gallium nitrate (Ganite®), cellulose sodium phosphate (Calcibind®), or etidronate (Didronel®)  · Calcium can change the way other medicines work if you take them at the same time  If you need to use other medicines, take them at least 2 hours before or 2 hours after you take your calcium supplement  This is particularly important if you are also using phenytoin (Dilantin®) or a tetracycline antibiotic to treat an infection (such as doxycycline, minocycline, Vibramycin®)  · Do not take your calcium supplement with a high-fiber meal (such as bran, whole-grain cereal or bread, fresh fruits)  Do not smoke cigarettes or cigars  Do not drink large amounts of alcohol or caffeine (for example, more than about 8 cups of coffee)    Warnings While Using This Medicine:   · Make sure your doctor knows if you are pregnant or breast feeding, or if you have kidney disease or have ever had kidney stones  Tell your doctor if you have had problems with too much calcium (hypercalcemia) or too little calcium in your blood (hypocalcemia)  Some health problems that can cause hypercalcemia are sarcoidosis, or problems with your parathyroid gland  · You should not use certain brands of this medicine if you have kidney disease or are on dialysis, because they may harm your kidneys  Ask your caregiver what brands are best for you  · Some health problems can affect how much calcium you should take  Tell your doctor if you have stomach or digestion problems, such as on-going diarrhea, not absorbing nutrients properly, or not having enough acid in your stomach  · This medicine might contain phenylalanine (aspartame)  This is only a concern if you have a disorder called phenylketonuria (a problem with amino acids)  If you have this condition, talk to your doctor before using this medicine  · If you are using a large amount of calcium or using it for a long time, your doctor might need to check your blood on a regular basis  Be sure to keep all appointments  Possible Side Effects While Using This Medicine:   Call your doctor right away if you notice any of these side effects:  · Headache that will not go away, dry mouth, loss of appetite, severe constipation  If you notice other side effects that you think are caused by this medicine, tell your doctor  Call your doctor for medical advice about side effects  You may report side effects to FDA at 3-862-FDA-5404  © 2017 [address] [employer] Information is for End User's use only and may not be sold, redistributed or otherwise used for commercial purposes  The above information is an  only  It is not intended as medical advice for individual conditions or treatments   Talk to your doctor, nurse or pharmacist before following any medical regimen to see if it is safe and effective for you

## 2019-12-07 PROBLEM — S80.10XA CONTUSION OF LOWER LEG: Status: ACTIVE | Noted: 2019-12-07

## 2019-12-07 PROBLEM — M79.605 PAIN IN BOTH LOWER EXTREMITIES: Status: ACTIVE | Noted: 2019-12-07

## 2019-12-07 PROBLEM — E89.41 HOT FLASHES DUE TO SURGICAL MENOPAUSE: Status: ACTIVE | Noted: 2019-12-07

## 2019-12-07 PROBLEM — M79.604 PAIN IN BOTH LOWER EXTREMITIES: Status: ACTIVE | Noted: 2019-12-07

## 2019-12-07 NOTE — PROGRESS NOTES
Diagnoses and all orders for this visit:    1  Hot flashes due to surgical menopause  -     conjugated estrogens (PREMARIN) 0 625 mg tablet; Take 2 tablets (1 25 mg total) by mouth daily Take daily for 21 days then do not take for 7 days  Will hold restarting premarin until dopplers are done  2  Pain in both lower extremities/3  Contusion of lower leg, unspecified laterality, initial encounter  -     VAS lower limb venous duplex study, complete bilateral; Future    [facility]  All questions and concerns answered  Call with any questions  Pleasant 37 y o  female presents today with c/o continued and hot flashes  She is in surgical menopause because of a a total hysterectomy  She was started on oral  Premarin by KTM 0 625  Then stopped and tried vaginal because she stated that vaginal discomfort was worsening  Now wants to stop vaginal treatment and restart oral tablet at a stronger dose because her hot flashes have increased  Describes using vaginal cream "occasionally", but is not clear about oral medication  She states that also started to notice bruising on her lower legs with pain in the past few weeks  She denies trauma to her legs, no swelling or redness seen today  Vitals:    12/06/19 1449   BP: 120/52   BP Location: Left arm   Patient Position: Sitting   Cuff Size: Standard   Weight: 72 8 kg (160 lb 8 oz)   Height: 5' 5" (1 651 m)     Body mass index is 26 71 kg/m²      Allergies   Allergen Reactions    Zithromax [Azithromycin] Hives    Gabapentin Visual Disturbance       Past Medical History:   Diagnosis Date    Abnormal Pap smear of cervix     Chronic pain     Endometriosis     Fibromyalgia     Insomnia     Migraine      Past Surgical History:   Procedure Laterality Date    BACK SURGERY  2009    HYSTERECTOMY      LAPAROSCOPY      x2    NASAL POLYP SURGERY      CT LAP,LYSIS OF ADHESIONS N/A 4/30/2019    Procedure: LYSIS OF ADHESIONS, FULGERATION OF LESIONS;  Surgeon: Cinthya Gutierrez MD;  [facility];  Service: Gynecology    LA LAP,RMV  ADNEXAL STRUCTURE Bilateral 2019    Procedure: LAPAROSCOPIC OOPHERECTOMY;  Surgeon: Cinthya Gutierrez MD;  [facility];  Service: Gynecology     Family History   Problem Relation Age of Onset    Diabetes Mother     Hypertension Mother     No Known Problems Father     Asthma Sister     Diabetes Brother     No Known Problems Daughter     No Known Problems Brother     No Known Problems Brother     Breast cancer Neg Hx     Colon cancer Neg Hx     Ovarian cancer Neg Hx     Cervical cancer Neg Hx     Uterine cancer Neg Hx      Social History     Tobacco Use    Smoking status: Never Smoker    Smokeless tobacco: Never Used   Substance Use Topics    Alcohol use: Yes     Comment: rarely     Drug use: No       Current Outpatient Medications:     SUMAtriptan (IMITREX) 50 mg tablet, Take 50 mg by mouth once as needed Take one tab with onset of migraine- if no relief within 2 hours may take a sencond dose  MAX 2 doses daily, Disp: , Rfl:     topiramate (TOPAMAX) 200 MG tablet, Take 200 mg by mouth 2 (two) times a day, Disp: , Rfl:     clotrimazole (LOTRIMIN) 1 % cream, Apply topically 2 (two) times a day for 14 days, Disp: 30 g, Rfl: 0    conjugated estrogens (PREMARIN) 0 625 mg tablet, Take 2 tablets (1 25 mg total) by mouth daily Take daily for 21 days then do not take for 7 days  , Disp: 42 tablet, Rfl: 2    OB History    Para Term  AB Living   3 3       3   SAB TAB Ectopic Multiple Live Births           3      # Outcome Date GA Lbr Arjun/2nd Weight Sex Delivery Anes PTL Lv   3 Para            2 Para            1 Para                Review of Systems     Review of Systems   Constitutional: Negative for chills, fatigue, fever and unexpected weight change  Respiratory: Negative for shortness of breath      Gastrointestinal: Negative for anal bleeding, blood in stool, constipation and diarrhea  Genitourinary: Negative for difficulty urinating, dysuria and hematuria  Physical Exam   Cardiovascular:   Pulses:       Dorsalis pedis pulses are 2+ on the right side, and 2+ on the left side  Posterior tibial pulses are 2+ on the right side, and 2+ on the left side  Feet:   Right Foot:   Skin Integrity: Positive for warmth  Left Foot:   Skin Integrity: Positive for warmth  Skin: Bruising noted  Physical Exam   Constitutional: She appears well-developed and well-nourished  No distress  Head: Normocephalic  Neck: Normal range of motion  Neck supple  Pulmonary: Effort normal  Lung sounds clear  Cardiac:  S1 & S2, regular rate and rhythm  Abdominal: Soft   Non-tender  Left and right l  Pelvic exam was deferred

## 2020-01-15 ENCOUNTER — APPOINTMENT (OUTPATIENT)
Dept: LAB | Facility: HOSPITAL | Age: 35
End: 2020-01-15
Payer: COMMERCIAL

## 2020-01-15 ENCOUNTER — OFFICE VISIT (OUTPATIENT)
Dept: OBGYN CLINIC | Facility: CLINIC | Age: 35
End: 2020-01-15
Payer: COMMERCIAL

## 2020-01-15 VITALS — BODY MASS INDEX: 27.12 KG/M2 | WEIGHT: 163 LBS

## 2020-01-15 DIAGNOSIS — Z11.3 SCREENING FOR STDS (SEXUALLY TRANSMITTED DISEASES): Primary | ICD-10-CM

## 2020-01-15 PROBLEM — N89.8 VAGINAL DISCHARGE: Status: RESOLVED | Noted: 2019-08-10 | Resolved: 2020-01-15

## 2020-01-15 PROBLEM — R23.2 HOT FLASHES: Status: RESOLVED | Noted: 2019-05-28 | Resolved: 2020-01-15

## 2020-01-15 PROCEDURE — 87340 HEPATITIS B SURFACE AG IA: CPT | Performed by: NURSE PRACTITIONER

## 2020-01-15 PROCEDURE — 86803 HEPATITIS C AB TEST: CPT | Performed by: NURSE PRACTITIONER

## 2020-01-15 PROCEDURE — 99213 OFFICE O/P EST LOW 20 MIN: CPT | Performed by: NURSE PRACTITIONER

## 2020-01-15 PROCEDURE — 86592 SYPHILIS TEST NON-TREP QUAL: CPT | Performed by: NURSE PRACTITIONER

## 2020-01-15 PROCEDURE — 86696 HERPES SIMPLEX TYPE 2 TEST: CPT | Performed by: NURSE PRACTITIONER

## 2020-01-15 PROCEDURE — 36415 COLL VENOUS BLD VENIPUNCTURE: CPT | Performed by: NURSE PRACTITIONER

## 2020-01-15 PROCEDURE — 86695 HERPES SIMPLEX TYPE 1 TEST: CPT | Performed by: NURSE PRACTITIONER

## 2020-01-15 PROCEDURE — 87591 N.GONORRHOEAE DNA AMP PROB: CPT | Performed by: NURSE PRACTITIONER

## 2020-01-15 PROCEDURE — 87491 CHLMYD TRACH DNA AMP PROBE: CPT | Performed by: NURSE PRACTITIONER

## 2020-01-15 PROCEDURE — 87389 HIV-1 AG W/HIV-1&-2 AB AG IA: CPT | Performed by: NURSE PRACTITIONER

## 2020-01-15 NOTE — PROGRESS NOTES
Diagnoses and all orders for this visit:    Screening for STDs (sexually transmitted diseases)  -     Chlamydia/GC amplified DNA by PCR  -     Hepatitis B surface antigen  -     Hepatitis C antibody  -     HIV 1/2 AG-AB combo  -     RPR  -     Herpes I/II IgG Antibodies  -     Herpes I /II IgM Ab Indriect        Call as needed, all questions answered, return for annual         Pleasant 37 y o  here for STD/POM testing  Denies vaginal issues  Denies fever, pelvic pain or dyspareunia  Doing well with her current HRT  Denies GI or  symptoms  Requests HSV testing as well  Advised HSV 1 is common in the general population and that testing is not generally recommended unless she is symptomatic but she still requested it  She had a hysterectomy yrs ago and then bilateral oopherectomy in 2019 for endometriosis      Past Medical History:   Diagnosis Date    Abnormal Pap smear of cervix     Chronic pain     Endometriosis     Fibromyalgia     Insomnia     Migraine      Past Surgical History:   Procedure Laterality Date    BACK SURGERY  2009    HYSTERECTOMY      LAPAROSCOPY      x2    NASAL POLYP SURGERY      FL LAP,LYSIS OF ADHESIONS N/A 4/30/2019    Procedure: LYSIS OF ADHESIONS, FULGERATION OF LESIONS;  Surgeon: Alexia Hoyt MD;  [facility];  Service: Gynecology    FL LAP,RMV  ADNEXAL STRUCTURE Bilateral 4/30/2019    Procedure: [place];  Surgeon: Alexia Hoyt MD;  [facility];  Service: Gynecology     Social History     Tobacco Use    Smoking status: Never Smoker    Smokeless tobacco: Never Used   Substance Use Topics    Alcohol use: Yes     Comment: rarely     Drug use: No     Family History   Problem Relation Age of Onset    Diabetes Mother     Hypertension Mother     No Known Problems Father     Asthma Sister     Diabetes Brother     No Known Problems Daughter     No Known Problems Brother     No Known Problems Brother     Breast cancer Neg Hx     Colon cancer Neg Hx     Ovarian cancer Neg Hx     Cervical cancer Neg Hx     Uterine cancer Neg Hx        Current Outpatient Medications:     conjugated estrogens (PREMARIN) 0 625 mg tablet, Take 2 tablets (1 25 mg total) by mouth daily Take daily for 21 days then do not take for 7 days  , Disp: 42 tablet, Rfl: 2    SUMAtriptan (IMITREX) 50 mg tablet, Take 50 mg by mouth once as needed Take one tab with onset of migraine- if no relief within 2 hours may take a sencond dose  MAX 2 doses daily, Disp: , Rfl:     topiramate (TOPAMAX) 200 MG tablet, Take 200 mg by mouth 2 (two) times a day, Disp: , Rfl:     clotrimazole (LOTRIMIN) 1 % cream, Apply topically 2 (two) times a day for 14 days, Disp: 30 g, Rfl: 0    Allergies   Allergen Reactions    Zithromax [Azithromycin] Hives    Gabapentin Visual Disturbance     OB History    Para Term  AB Living   3 3       3   SAB TAB Ectopic Multiple Live Births           3      # Outcome Date GA Lbr Arjun/2nd Weight Sex Delivery Anes PTL Lv   3 Para            2 Para            1 Para                Vitals:    01/15/20 1531   Weight: 73 9 kg (163 lb)     Body mass index is 27 12 kg/m²  Review of Systems   Constitutional: Negative for chills, fatigue, fever and unexpected weight change  Respiratory: Negative for shortness of breath  Gastrointestinal: Negative for anal bleeding, blood in stool, constipation and diarrhea  Genitourinary: Negative for difficulty urinating, dysuria and hematuria  Physical Exam   Constitutional: She appears well-developed and well-nourished  No distress  Alert and oriented  HENT: atraumatic  Head: Normocephalic  Neck: Normal range of motion  Neck supple  Pulmonary: Effort normal   Abdominal: Soft  Pelvic exam was performed with patient supine  No labial fusion  There is no rash, tenderness, lesion or injury on the right labia  There is no rash, tenderness, lesion or injury on the left labia   Urethral meatus does not show any tenderness, inflammation or discharge  Palpation of midline bladder without pain or discomfort  Uterus/cervix ABSENT  Right adnexal area displays no mass, no tenderness and no fullness  Left adnexal area displays no mass, no tenderness and no fullness  No erythema or tenderness in the vagina  No foreign body in the vagina  No signs of injury around the vagina  No vaginal discharge found  Perineum and anus without areas of injury  No lesions noted or swelling  Lymphadenopathy:        Right: No inguinal adenopathy present  Left: No inguinal adenopathy present

## 2020-01-15 NOTE — PATIENT INSTRUCTIONS
Menopause   WHAT YOU NEED TO KNOW:   What is menopause? Menopause is a normal stage in a woman's life when her monthly periods stop  Menopause starts when the ovaries slowly stop making the female hormones estrogen and progesterone  After menopause, a woman is no longer able to become pregnant  A woman who has not had a period for a full year after the age of 39 is considered to be in menopause  Perimenopause is a stage before menopause that may cause signs and symptoms similar to menopause  Perimenopause can last an average of 4 to 5 years  What are the signs and symptoms of menopause? The signs and symptoms of menopause can be different from woman to woman:  · Menstrual period changes such as skipped periods or periods that are closer together, or lighter or heavier than usual    · Hot flashes (feeling warm, flushed, and sweaty)     · Mood changes such as irritability or decreased desire to have sex    · Breast changes such as tenderness or pain    · Hair changes such as thinning hair or increased hair on your face    · Vaginal changes such as increased dryness     · Urinary changes such as increased urinary tract infections (UTIs) or urgency (feeling that you need to urinate right away)    · Other symptoms such as headaches, trouble sleeping, fatigue, or heart palpitations (strong, fast heartbeats)  What do I need to know about menopause? · You can still get pregnant while you have periods  Continue to use birth control if you do not want to get pregnant  You may need to use birth control until it has been 1 year since your periods stopped  Ask your healthcare provider when you can stop using birth control to prevent pregnancy  · Hormone replacement therapy can be used to treat symptoms of menopause  Hormone replacement therapy (HRT) is medicine that replaces your low hormone levels  HRT contains estrogen and sometimes progestin  HRT has benefits and risks   HRT decreases your risk for bone fractures by helping to prevent osteoporosis  HRT also protects you from colon cancer  HRT may increase your risk for breast cancer, blood clots, heart disease, and stroke  Ask your healthcare provider if HRT is right for you  How can I live a healthy lifestyle during and after menopause? After menopause, your risk for heart disease and bone loss increases  Ask about these and other ways to stay healthy:  · Exercise regularly  Exercise helps you maintain a healthy weight  Exercise can also help to control your blood pressure and cholesterol levels  Include weight-bearing exercise for strong bones  Ask your healthcare provider about the best exercise plan for you  · Eat a variety of healthy foods  Include fruits, vegetables, whole grains (whole-wheat bread, pasta, and cereals), low-fat dairy, and lean protein foods (beans, poultry, and fish)  Limit foods high in sodium (salt)  Ask your healthcare provider for more information about a meal plan that is right for you  · Maintain a healthy weight  Check with your healthcare provider before you start any weight loss program      · Take supplements as directed  You may need extra calcium and vitamin D to help prevent osteoporosis  · Limit alcohol and caffeine  Alcohol and caffeine may worsen your symptoms  · Do not smoke  If you smoke, it is never too late to quit  You are more likely to have a heart attack, lung disease, blood clots, and cancer if you smoke  Ask your healthcare provider for information if you need help quitting  When should I contact my healthcare provider? · You have vaginal bleeding after menopause  · You have questions or concerns about your condition or care  CARE AGREEMENT:   You have the right to help plan your care  Learn about your health condition and how it may be treated  Discuss treatment options with your caregivers to decide what care you want to receive  You always have the right to refuse treatment   The above information is an  only  It is not intended as medical advice for individual conditions or treatments  Talk to your doctor, nurse or pharmacist before following any medical regimen to see if it is safe and effective for you  © 2017 [employer] Information is for End User's use only and may not be sold, redistributed or otherwise used for commercial purposes  All illustrations and images included in CareNotes® are the copyrighted property of [employer]  or David [employer]

## 2020-01-16 LAB
HBV SURFACE AG SER QL: NORMAL
HCV AB SER QL: NORMAL
HSV1 IGG SER IA-ACNC: 2.52 INDEX (ref 0–0.9)
HSV2 IGG SER IA-ACNC: 7.23 INDEX (ref 0–0.9)
RPR SER QL: NORMAL

## 2020-01-17 LAB
C TRACH DNA SPEC QL NAA+PROBE: NEGATIVE
HIV 1+2 AB+HIV1 P24 AG SERPL QL IA: NORMAL
N GONORRHOEA DNA SPEC QL NAA+PROBE: NEGATIVE

## 2020-01-20 LAB
HSV1 IGM TITR SER IF: NORMAL TITER
HSV2 IGM TITR SER IF: NORMAL TITER

## 2020-01-22 ENCOUNTER — TELEPHONE (OUTPATIENT)
Dept: OBGYN CLINIC | Facility: CLINIC | Age: 35
End: 2020-01-22

## 2020-01-22 NOTE — TELEPHONE ENCOUNTER
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

## 2020-01-23 ENCOUNTER — TELEPHONE (OUTPATIENT)
Dept: OBGYN CLINIC | Facility: CLINIC | Age: 35
End: 2020-01-23

## 2020-01-23 DIAGNOSIS — B00.9 HSV (HERPES SIMPLEX VIRUS) INFECTION: Primary | ICD-10-CM

## 2020-01-23 NOTE — TELEPHONE ENCOUNTER
Patient called to discuss test results  She also stated she is having some swelling and irritation   Please advise

## 2020-01-23 NOTE — TELEPHONE ENCOUNTER
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

## 2020-01-24 PROBLEM — Z11.3 SCREENING FOR STD (SEXUALLY TRANSMITTED DISEASE): Status: RESOLVED | Noted: 2019-08-10 | Resolved: 2020-01-24

## 2020-01-24 PROBLEM — B00.9 HSV (HERPES SIMPLEX VIRUS) INFECTION: Status: ACTIVE | Noted: 2020-01-24

## 2020-01-24 RX ORDER — VALACYCLOVIR HYDROCHLORIDE 500 MG/1
500 TABLET, FILM COATED ORAL 2 TIMES DAILY
Qty: 6 TABLET | Refills: 2 | Status: SHIPPED | OUTPATIENT
Start: 2020-01-24 | End: 2020-03-13 | Stop reason: ALTCHOICE

## 2020-03-13 ENCOUNTER — OFFICE VISIT (OUTPATIENT)
Dept: OBGYN CLINIC | Facility: CLINIC | Age: 35
End: 2020-03-13
Payer: COMMERCIAL

## 2020-03-13 VITALS — WEIGHT: 160.8 LBS | BODY MASS INDEX: 26.76 KG/M2 | SYSTOLIC BLOOD PRESSURE: 110 MMHG | DIASTOLIC BLOOD PRESSURE: 80 MMHG

## 2020-03-13 DIAGNOSIS — E89.41 HOT FLASHES DUE TO SURGICAL MENOPAUSE: Primary | ICD-10-CM

## 2020-03-13 PROCEDURE — 99212 OFFICE O/P EST SF 10 MIN: CPT | Performed by: NURSE PRACTITIONER

## 2020-03-13 RX ORDER — FERROUS SULFATE 325(65) MG
325 TABLET ORAL
COMMUNITY

## 2020-03-13 RX ORDER — ERGOCALCIFEROL 1.25 MG/1
50000 CAPSULE ORAL
COMMUNITY

## 2020-03-13 NOTE — PROGRESS NOTES
Assessment/Plan:    No problem-specific Assessment & Plan notes found for this encounter  Diagnoses and all orders for this visit:    1  Hot flashes due to surgical menopause  Doing well, no changes, continue on patch, ordered by [place] PCP  Reviewed also dietary measures to monitor, caffeine, red wine, chocolate, etc   All questions and concerns answered  Call with any problems  RTO for annual       Other orders  -     ergocalciferol (VITAMIN D2) 50,000 units; Take 50,000 Units by mouth  -     ferrous sulfate 325 (65 Fe) mg tablet; Take 325 mg by mouth daily with breakfast          Subjective:      Patient ID: Rell Harris is a 37 y o  female  HPI  Pleasant 37 y o female presents today for a f/u exam   Switched from oral tablets for hot flashes to patches for hot flashes  Doing well, no complaints  Wants to continue with the patch  The following portions of the patient's history were reviewed and updated as appropriate: allergies, current medications, past family history, past medical history, past social history, past surgical history and problem list     Review of Systems   Constitutional: Negative for chills, fatigue, fever and unexpected weight change  Cardiovascular: Negative for chest pain and leg swelling  Gastrointestinal: Negative for constipation, diarrhea, nausea and vomiting  Endocrine: Positive for heat intolerance (hot flashes well controlled)  Genitourinary: Negative for dysuria, frequency, menstrual problem, pelvic pain, vaginal bleeding and vaginal discharge  Neurological: Negative for headaches  Objective:      /80 (BP Location: Left arm, Patient Position: Sitting, Cuff Size: Standard)   Wt 72 9 kg (160 lb 12 8 oz)   LMP  (LMP Unknown)   BMI 26 76 kg/m²          Physical Exam   Constitutional: She is oriented to person, place, and time  She appears well-developed and well-nourished  Cardiovascular: Normal rate and regular rhythm  Pulmonary/Chest: Effort normal and breath sounds normal    Abdominal: Soft  Neurological: She is alert and oriented to person, place, and time  Skin: Skin is warm and dry  Psychiatric: She has a normal mood and affect  Her behavior is normal    Nursing note and vitals reviewed

## 2020-03-24 ENCOUNTER — OFFICE VISIT (OUTPATIENT)
Dept: OBGYN CLINIC | Facility: MEDICAL CENTER | Age: 35
End: 2020-03-24
Payer: COMMERCIAL

## 2020-03-24 VITALS — SYSTOLIC BLOOD PRESSURE: 120 MMHG | DIASTOLIC BLOOD PRESSURE: 84 MMHG | BODY MASS INDEX: 27.16 KG/M2 | WEIGHT: 163.2 LBS

## 2020-03-24 DIAGNOSIS — N89.8 VAGINAL DISCHARGE: Primary | ICD-10-CM

## 2020-03-24 DIAGNOSIS — R10.2 PELVIC PRESSURE IN FEMALE: ICD-10-CM

## 2020-03-24 DIAGNOSIS — N93.9 VAGINAL BLEEDING: ICD-10-CM

## 2020-03-24 LAB
BV WHIFF TEST VAG QL: POSITIVE
CLUE CELLS SPEC QL WET PREP: POSITIVE
PH SMN: 5 [PH]
SL AMB  POCT GLUCOSE, UA: ABNORMAL
SL AMB LEUKOCYTE ESTERASE,UA: ABNORMAL
SL AMB POCT BILIRUBIN,UA: ABNORMAL
SL AMB POCT BLOOD,UA: 2
SL AMB POCT CLARITY,UA: CLEAR
SL AMB POCT COLOR,UA: ABNORMAL
SL AMB POCT KETONES,UA: ABNORMAL
SL AMB POCT NITRITE,UA: ABNORMAL
SL AMB POCT PH,UA: ABNORMAL
SL AMB POCT SPECIFIC GRAVITY,UA: 1.03
SL AMB POCT URINE PROTEIN: ABNORMAL
SL AMB POCT UROBILINOGEN: ABNORMAL
SL AMB POCT WET MOUNT: ABNORMAL
T VAGINALIS VAG QL WET PREP: NEGATIVE
YEAST VAG QL WET PREP: NEGATIVE

## 2020-03-24 PROCEDURE — 87086 URINE CULTURE/COLONY COUNT: CPT | Performed by: NURSE PRACTITIONER

## 2020-03-24 PROCEDURE — 99214 OFFICE O/P EST MOD 30 MIN: CPT | Performed by: NURSE PRACTITIONER

## 2020-03-24 PROCEDURE — 81001 URINALYSIS AUTO W/SCOPE: CPT | Performed by: NURSE PRACTITIONER

## 2020-03-24 PROCEDURE — 87210 SMEAR WET MOUNT SALINE/INK: CPT | Performed by: NURSE PRACTITIONER

## 2020-03-24 PROCEDURE — 81002 URINALYSIS NONAUTO W/O SCOPE: CPT | Performed by: NURSE PRACTITIONER

## 2020-03-24 RX ORDER — METRONIDAZOLE 500 MG/1
500 TABLET ORAL EVERY 12 HOURS SCHEDULED
Qty: 14 TABLET | Refills: 0 | Status: SHIPPED | OUTPATIENT
Start: 2020-03-24 | End: 2020-03-31

## 2020-03-24 NOTE — ASSESSMENT & PLAN NOTE
S/P QUINN No blood seen in vagina or noted during bimanual exam   Vaginal cuff without lesion or redness  Will monitor and see if persists after tx for BV

## 2020-03-24 NOTE — ASSESSMENT & PLAN NOTE
Exam c/w BV  Recommended Flagyl, conservative vulvar hygiene, pelvic rest  Reviewed directions for use, risks/benefits, antabuse effects  F/U PRN if symptoms not improved

## 2020-03-24 NOTE — PROGRESS NOTES
Assessment/Plan:    Pelvic pressure in female  UA with C&S sent today  Urine dip showed moderate blood and small leukocytes  Advised NSAID  Vaginal bleeding  S/P QUINN No blood seen in vagina or noted during bimanual exam   Vaginal cuff without lesion or redness  Will monitor and see if persists after tx for BV  Vaginal discharge  Exam c/w BV  Recommended Flagyl, conservative vulvar hygiene, pelvic rest  Reviewed directions for use, risks/benefits, antabuse effects  F/U PRN if symptoms not improved  Diagnoses and all orders for this visit:    Vaginal discharge  -     POCT wet mount  -     metroNIDAZOLE (FLAGYL) 500 mg tablet; Take 1 tablet (500 mg total) by mouth every 12 (twelve) hours for 7 days    Pelvic pressure in female  -     Urinalysis with microscopic  -     Urine culture  -     POCT urine dip    Vaginal bleeding        Subjective:      Patient ID: John Panchal is a 37 y o  female  Chely Centeno is a 37year old female who presents with complaints of vaginal bleeding-she thinks since yesterday and pelvic pressure with back pain  The bleeding was noted when wiping  None in her underpants  She has a history of a hysterectomy for endometriosis followed by BSO for pain  She denies urinary frequency, urgency or burning on urination  She denies unusual vaginal discharge or odor or fever/chills  She is sexually active-last intercourse a few days ago  She declines STI testing  The following portions of the patient's history were reviewed and updated as appropriate: allergies, current medications, past family history, past medical history, past social history, past surgical history and problem list     Review of Systems   Constitutional: Negative  Gastrointestinal: Negative for abdominal pain, constipation and diarrhea  Endocrine: Negative      Genitourinary: Negative for decreased urine volume, difficulty urinating, dyspareunia, dysuria, flank pain, frequency, pelvic pain and vaginal discharge  + pelvic pressure    Musculoskeletal: Positive for back pain  Skin: Negative  Neurological: Negative  Psychiatric/Behavioral: Negative  Objective:      /84 (BP Location: Right arm, Patient Position: Sitting, Cuff Size: Standard)   Wt 74 kg (163 lb 3 2 oz)   LMP  (LMP Unknown)   BMI 27 16 kg/m²          Physical Exam   Constitutional: She is oriented to person, place, and time  She appears well-developed and well-nourished  HENT:   Head: Normocephalic  Eyes: Pupils are equal, round, and reactive to light  Neck: Normal range of motion  Pulmonary/Chest: Effort normal    Abdominal: There is no tenderness  Genitourinary: There is no rash, tenderness or lesion on the right labia  There is no rash, tenderness or lesion on the left labia  Right adnexum displays no mass  Left adnexum displays no mass  No erythema, tenderness or bleeding in the vagina  Vaginal discharge found  Genitourinary Comments: Uterus and cervix are surgically absent    Musculoskeletal: Normal range of motion  Neurological: She is alert and oriented to person, place, and time  Skin: Skin is warm and dry  Capillary refill takes less than 2 seconds  Psychiatric: She has a normal mood and affect       Wet prep Ph 5 0, + clue, + whiff, no trich or yeast

## 2020-03-25 ENCOUNTER — TELEPHONE (OUTPATIENT)
Dept: OBGYN CLINIC | Facility: CLINIC | Age: 35
End: 2020-03-25

## 2020-03-25 LAB
BACTERIA UR CULT: NORMAL
BACTERIA UR QL AUTO: ABNORMAL /HPF
BILIRUB UR QL STRIP: NEGATIVE
CLARITY UR: ABNORMAL
COLOR UR: YELLOW
FINE GRAN CASTS URNS QL MICRO: ABNORMAL /LPF
GLUCOSE UR STRIP-MCNC: NEGATIVE MG/DL
HGB UR QL STRIP.AUTO: ABNORMAL
KETONES UR STRIP-MCNC: NEGATIVE MG/DL
LEUKOCYTE ESTERASE UR QL STRIP: ABNORMAL
MUCOUS THREADS UR QL AUTO: ABNORMAL
NITRITE UR QL STRIP: NEGATIVE
NON-SQ EPI CELLS URNS QL MICRO: ABNORMAL /HPF
PH UR STRIP.AUTO: 5.5 [PH]
PROT UR STRIP-MCNC: ABNORMAL MG/DL
RBC #/AREA URNS AUTO: ABNORMAL /HPF
SP GR UR STRIP.AUTO: 1.02 (ref 1–1.03)
UROBILINOGEN UR QL STRIP.AUTO: 0.2 E.U./DL
WBC #/AREA URNS AUTO: ABNORMAL /HPF
WBC CASTS URNS QL MICRO: ABNORMAL /LPF

## 2020-03-25 NOTE — TELEPHONE ENCOUNTER
Called and spoke with Pérez Pickard  She had additional questions and stated she is still having pelvic pain  Keyona Treviño then spoke with her and will reach out to Dr Juliana Decree- Davida Duverney

## 2020-03-26 ENCOUNTER — TELEPHONE (OUTPATIENT)
Dept: OBGYN CLINIC | Facility: CLINIC | Age: 35
End: 2020-03-26

## 2020-03-26 NOTE — TELEPHONE ENCOUNTER
Dr Soumya Keita-     Patient states she is on the estogen patch right now so she doesn't want to do any other methods until she is seen  She is also going to keep taking her metronidazole  She states the bleeding is coming from her vagina and wants to be evaluated  She was very chandra about being seen so I made a appt with you on Monday  She states this is not normal and very worried  She is having pressure and pain  She feels like she is "sitting on a rock" so not sure if this could be bladder prolapse

## 2020-03-26 NOTE — TELEPHONE ENCOUNTER
Pt  Called in, she was just seen 3/24 and her pain has not let up  She was looking to come in for another appt  But I advised since she was just seen, to allow me to triage her call so her provider can advise her on what she could do for the pain    Best number ending in: [phone]  Avail   For a call back: FREE

## 2020-03-26 NOTE — TELEPHONE ENCOUNTER
I didnt see her last si I am unsure how to  her  Bleeding may not be from infection but from atrophy- both ovaries have been removed  In setting of atrophy- when there is an infection it can take longer to resolve  Without doing an exam I would recommend estrogen cream 0 5gm three times per week  If metronidazole was topical can switch to oral treatment    If it was oral- then finish course, start/continue estrogen    If she is already doing this I would need an exam

## 2020-03-26 NOTE — TELEPHONE ENCOUNTER
Called and spoke with patient metronidazole is not helping her symptoms but she will continue taking for BV  Her vaginal bleeding is still ongoing  She is s/p hysterectomy for pain and endometriosis  She is having severe pain in her vaginal area and pelvis  Urine culture- no growth so no UTI  She wants appointment but wanted to contact you about this first and got your opinion since you did her surgery  Ruby Waters saw her 2 days ago and tiger texting you as well  Please advise

## 2020-03-27 ENCOUNTER — OFFICE VISIT (OUTPATIENT)
Dept: OBGYN CLINIC | Facility: CLINIC | Age: 35
End: 2020-03-27
Payer: COMMERCIAL

## 2020-03-27 ENCOUNTER — TELEPHONE (OUTPATIENT)
Dept: OBGYN CLINIC | Facility: CLINIC | Age: 35
End: 2020-03-27

## 2020-03-27 VITALS — SYSTOLIC BLOOD PRESSURE: 110 MMHG | DIASTOLIC BLOOD PRESSURE: 80 MMHG | WEIGHT: 163.2 LBS | BODY MASS INDEX: 27.16 KG/M2

## 2020-03-27 DIAGNOSIS — R31.9 HEMATURIA, UNSPECIFIED TYPE: ICD-10-CM

## 2020-03-27 DIAGNOSIS — R30.0 DYSURIA: Primary | ICD-10-CM

## 2020-03-27 DIAGNOSIS — N93.9 VAGINAL BLEEDING: ICD-10-CM

## 2020-03-27 PROBLEM — N95.2 VAGINAL ATROPHY: Status: RESOLVED | Noted: 2019-04-01 | Resolved: 2020-03-27

## 2020-03-27 PROCEDURE — 87086 URINE CULTURE/COLONY COUNT: CPT | Performed by: OBSTETRICS & GYNECOLOGY

## 2020-03-27 PROCEDURE — 99214 OFFICE O/P EST MOD 30 MIN: CPT | Performed by: OBSTETRICS & GYNECOLOGY

## 2020-03-27 PROCEDURE — 81001 URINALYSIS AUTO W/SCOPE: CPT | Performed by: OBSTETRICS & GYNECOLOGY

## 2020-03-27 NOTE — TELEPHONE ENCOUNTER
Explained to pt that u/a and culture are still in process  She was sent for a repeat as u/a was abnormal, but culture was wnl

## 2020-03-27 NOTE — PATIENT INSTRUCTIONS
Interstitial Cystitis   WHAT YOU NEED TO KNOW:   What is interstitial cystitis? Interstitial cystitis (IC) is also called painful bladder syndrome  IC is a condition that causes pain in your bladder and pelvic area  You may also have ulcers in your bladder  The cause of IC is unknown  What are other symptoms of IC?   · Pressure in your bladder and pelvic area    · Urgent need to urinate    · Urinating more often (urinary frequency) or waking from sleep to urinate     · Increased pain during menstruation    · Pain in the penis or scrotum in men    · Pain during sex  How is IC diagnosed? Your healthcare provider will examine you and ask questions about your symptoms  He will also ask about any medical conditions you have and medicines you take  Your healthcare provider may ask you to keep track of how much liquid you drink  You may also need to keep track of how often and how much you urinate  Your healthcare provider may order tests to rule out other causes of IC  Examples include urine tests or a cystoscopy  A cystoscopy is a procedure to look inside your urethra and bladder  How is IC treated? The goal of treatment is to control your symptoms  Your healthcare provider will work with you to find the treatment plan that works best to help control your symptoms  He may recommend any of the following:  · Nutrition changes  may be needed  Certain foods may worsen your symptoms  These foods include citrus fruits (oranges, nilson), tomatoes, chocolate, and coffee  Other foods and drinks that may worsen your symptoms include alcohol, spicy foods, and carbonated drinks (soft drinks)  · Medicines  may be given to decrease symptoms such as pain, urinary urgency, and frequency  These medicines may be taken by mouth or placed directly into your bladder  · Bladder distension  is a procedure to stretch the walls of your bladder using gas or fluid  · Electrical stimulation  may be done to control symptoms  Mild electrical pulses are sent to the nerves in your bladder  These impulses may help increase blood flow to your bladder or strengthen the muscles that control your bladder  It may also help release hormones that block pain  · Surgery  may be done to remove ulcers in your bladder  Surgery may also be done to make your bladder larger  Damaged areas of your bladder are removed and replaced with tissue from your large intestine  How can I manage my symptoms? · Do Kegel exercises as directed  Kegel exercises will help strengthen the muscles that control bowel movements and urination  Ask your healthcare provider for more information on Kegel exercises  Tighten your pelvic muscles slowly  It may feel like you are trying to hold back urine or gas  Hold these muscles and count to 3  Relax, tighten them quickly, and release  Repeat the cycle 10 times  Do 10 sets of Kegel exercises, 5 times a day  Do not hold your breath when you do Kegel exercises  Keep your stomach, back, and leg muscles relaxed  · Do not smoke  Smoking may worsen your symptoms  Nicotine and other chemicals in cigarettes and cigars can also cause lung damage  Ask your healthcare provider for information if you currently smoke and need help to quit  E-cigarettes or smokeless tobacco still contain nicotine  Talk to your healthcare provider before you use these products  · Train your bladder to urinate less often  This can be done by going to the bathroom at scheduled times  Try to hold your urine when you feel the urge to go  For example, hold your urine for 5 minutes when you feel the urge to go  As that becomes easier, hold your urine for 10 minutes  · Manage stress  Stress may worsen symptoms  Your healthcare provider may also recommend that you find ways to manage stress, such as relaxation techniques  When should I contact my healthcare provider? · Your symptoms get worse, or you develop new symptoms       · You have questions or concerns about your condition or care  CARE AGREEMENT:   You have the right to help plan your care  Learn about your health condition and how it may be treated  Discuss treatment options with your caregivers to decide what care you want to receive  You always have the right to refuse treatment  The above information is an  only  It is not intended as medical advice for individual conditions or treatments  Talk to your doctor, nurse or pharmacist before following any medical regimen to see if it is safe and effective for you  © 2017 [employer] Information is for End User's use only and may not be sold, redistributed or otherwise used for commercial purposes  All illustrations and images included in CareNotes® are the copyrighted property of [employer]  or David Reynolds

## 2020-03-27 NOTE — PROGRESS NOTES
Assessment/Plan:    No problem-specific Assessment & Plan notes found for this encounter  Diagnoses and all orders for this visit:    Dysuria  -     Urine culture  -     Urinalysis with microscopic    Vaginal bleeding  Comments:  No evidence of infection, atrophy or bleeding on exam today    Hematuria, unspecified type  Comments: Will repeat urine sample - if infection ABX  to be given  If hematuria w/o infection referral to urology          Subjective:      Patient ID: Lawrence Hendricks is a 37 y o  female  Pt having pelvic pain/pressure  Has vaginal bleeding when she wipes  37year old  s/p QUINN-BSO for endometriosis presents with vaginal bleeding, and pelvic pain  1  Vaginal bleeding began over one week ago, noticed when she wipes only, not on underwear - post void  Initially pink, then noticed bright red (showed me picture of toilet paper x 2 with red fluid approximately 3x3cm  Described as liquid not thick/mucus/yolk like  Was seen last week and started on metronidazole for BV but  In note no mention of blood in the vagina at that time  Urinalysis + blood, but culture negative  Patient called stating symptoms have continued despite metronidazole use (only on day 4)  2  Pain - pelvic pain midline  Sudden onset when sitting  Began 2 weeks ago  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) now back pain has resolved but continues to have pelvic pressure/pain  Described as quick onset, sharp pain  Persistent aching  No dyspareunia  No incomplete emptying of urine  No constipation  The following portions of the patient's history were reviewed and updated as appropriate:   She  has a past medical history of Abnormal Pap smear of cervix, Chronic pain, Endometriosis, Fibromyalgia, Insomnia, and Migraine    She   Patient Active Problem List    Diagnosis Date Noted    Vaginal discharge 2020    Pelvic pressure in female 2020    Vaginal bleeding 03/24/2020    HSV (herpes simplex virus) infection 01/24/2020    Pain in both lower extremities 12/07/2019    Hot flashes due to surgical menopause 12/07/2019    Contusion of lower leg 12/07/2019    Endometriosis 04/05/2019     She  has a past surgical history that includes Hysterectomy; Back surgery (2009); LAPAROSCOPY; Nasal polyp surgery; pr lap,rmv  adnexal structure (Bilateral, 4/30/2019); and pr lap,lysis of adhesions (N/A, 4/30/2019)  Her family history includes Asthma in her sister; Diabetes in her brother and mother; Hypertension in her mother; No Known Problems in her brother, brother, daughter, and father  She  reports that she has never smoked  She has never used smokeless tobacco  She reports that she drinks alcohol  She reports that she does not use drugs  Current Outpatient Medications   Medication Sig Dispense Refill    ergocalciferol (VITAMIN D2) 50,000 units Take 50,000 Units by mouth      ferrous sulfate 325 (65 Fe) mg tablet Take 325 mg by mouth daily with breakfast      metroNIDAZOLE (FLAGYL) 500 mg tablet Take 1 tablet (500 mg total) by mouth every 12 (twelve) hours for 7 days 14 tablet 0    SUMAtriptan (IMITREX) 50 mg tablet Take 50 mg by mouth once as needed Take one tab with onset of migraine- if no relief within 2 hours may take a sencond dose  MAX 2 doses daily      clotrimazole (LOTRIMIN) 1 % cream Apply topically 2 (two) times a day for 14 days 30 g 0    conjugated estrogens (PREMARIN) 0 625 mg tablet Take 2 tablets (1 25 mg total) by mouth daily Take daily for 21 days then do not take for 7 days  (Patient not taking: Reported on 3/24/2020) 42 tablet 2     No current facility-administered medications for this visit        Current Outpatient Medications on File Prior to Visit   Medication Sig    ergocalciferol (VITAMIN D2) 50,000 units Take 50,000 Units by mouth    ferrous sulfate 325 (65 Fe) mg tablet Take 325 mg by mouth daily with breakfast    metroNIDAZOLE (FLAGYL) 500 mg tablet Take 1 tablet (500 mg total) by mouth every 12 (twelve) hours for 7 days    SUMAtriptan (IMITREX) 50 mg tablet Take 50 mg by mouth once as needed Take one tab with onset of migraine- if no relief within 2 hours may take a sencond dose  MAX 2 doses daily    clotrimazole (LOTRIMIN) 1 % cream Apply topically 2 (two) times a day for 14 days    conjugated estrogens (PREMARIN) 0 625 mg tablet Take 2 tablets (1 25 mg total) by mouth daily Take daily for 21 days then do not take for 7 days  (Patient not taking: Reported on 3/24/2020)     No current facility-administered medications on file prior to visit  She is allergic to zithromax [azithromycin] and gabapentin       Review of Systems   Constitutional: Negative for activity change, appetite change, chills, fatigue and fever  HENT: Negative for rhinorrhea, sneezing and sore throat  Eyes: Negative for visual disturbance  Respiratory: Negative for cough, shortness of breath and wheezing  Cardiovascular: Negative for chest pain, palpitations and leg swelling  Gastrointestinal: Negative for abdominal distention, constipation, diarrhea, nausea and vomiting  Genitourinary: Positive for hematuria, pelvic pain and vaginal bleeding  Negative for decreased urine volume, difficulty urinating, dyspareunia, dysuria, flank pain, frequency, genital sores, urgency, vaginal discharge and vaginal pain  Neurological: Negative for syncope and light-headedness  Objective:      /80 (BP Location: Left arm, Patient Position: Sitting, Cuff Size: Standard)   Wt 74 kg (163 lb 3 2 oz)   LMP  (LMP Unknown)   BMI 27 16 kg/m²          Physical Exam   Abdominal: Soft  Normal appearance  There is tenderness in the suprapubic area  There is no rigidity, no rebound and no guarding  Hernia confirmed negative in the right inguinal area and confirmed negative in the left inguinal area     Genitourinary: Vagina normal  There is no rash, tenderness or lesion on the right labia  There is no rash, tenderness or lesion on the left labia  Right adnexum displays no mass, no tenderness and no fullness  Left adnexum displays no mass, no tenderness and no fullness  No erythema or tenderness in the vagina  No vaginal discharge (thin white  pH 4 5, wet - no clue cells, no LB   wet- no hyphae) found  Lymphadenopathy: No inguinal adenopathy noted on the right or left side

## 2020-03-28 LAB
BACTERIA UR CULT: NORMAL
BACTERIA UR QL AUTO: ABNORMAL /HPF
BILIRUB UR QL STRIP: NEGATIVE
CLARITY UR: CLEAR
COLOR UR: ABNORMAL
GLUCOSE UR STRIP-MCNC: NEGATIVE MG/DL
HGB UR QL STRIP.AUTO: NEGATIVE
HYALINE CASTS #/AREA URNS LPF: ABNORMAL /LPF
KETONES UR STRIP-MCNC: NEGATIVE MG/DL
LEUKOCYTE ESTERASE UR QL STRIP: ABNORMAL
NITRITE UR QL STRIP: NEGATIVE
NON-SQ EPI CELLS URNS QL MICRO: ABNORMAL /HPF
PH UR STRIP.AUTO: 6 [PH]
PROT UR STRIP-MCNC: NEGATIVE MG/DL
RBC #/AREA URNS AUTO: ABNORMAL /HPF
SP GR UR STRIP.AUTO: 1.02 (ref 1–1.03)
UROBILINOGEN UR QL STRIP.AUTO: 0.2 E.U./DL
WBC #/AREA URNS AUTO: ABNORMAL /HPF

## 2020-03-30 ENCOUNTER — TELEPHONE (OUTPATIENT)
Dept: OBGYN CLINIC | Facility: CLINIC | Age: 35
End: 2020-03-30

## 2020-03-30 NOTE — TELEPHONE ENCOUNTER
Pt contacted and advised as directed  Pt stated bleeding stopped yesterday, however she is still having the pain  Pt advised due to no bleeding no further follow up needed  Please advise if you have further recommendations for pain

## 2020-08-11 ENCOUNTER — TELEPHONE (OUTPATIENT)
Dept: OBGYN CLINIC | Facility: CLINIC | Age: 35
End: 2020-08-11

## 2020-08-11 DIAGNOSIS — N89.8 VAGINAL DISCHARGE: Primary | ICD-10-CM

## 2020-08-11 DIAGNOSIS — B37.3 YEAST VAGINITIS: Primary | ICD-10-CM

## 2020-08-11 RX ORDER — FLUCONAZOLE 150 MG/1
TABLET ORAL
Qty: 2 TABLET | Refills: 0 | Status: SHIPPED | OUTPATIENT
Start: 2020-08-11 | End: 2020-08-13

## 2020-08-11 RX ORDER — FLUCONAZOLE 150 MG/1
TABLET ORAL
Qty: 2 TABLET | Refills: 0 | Status: CANCELLED | OUTPATIENT
Start: 2020-08-11 | End: 2020-08-14

## 2020-08-11 NOTE — TELEPHONE ENCOUNTER
Patient thinks she has a yeast infection  Patient of Yari Mendoza  She has had itching and white discharge for a few days   She would like diflucan

## 2021-01-08 ENCOUNTER — IMMUNIZATIONS (OUTPATIENT)
Dept: FAMILY MEDICINE CLINIC | Facility: HOSPITAL | Age: 36
End: 2021-01-08

## 2021-01-08 DIAGNOSIS — Z23 ENCOUNTER FOR IMMUNIZATION: ICD-10-CM

## 2021-01-08 PROCEDURE — 91301 SARS-COV-2 / COVID-19 MRNA VACCINE (MODERNA) 100 MCG: CPT

## 2021-01-08 PROCEDURE — 0011A SARS-COV-2 / COVID-19 MRNA VACCINE (MODERNA) 100 MCG: CPT

## 2021-02-02 NOTE — TELEPHONE ENCOUNTER
At this time, non gyn causes should be evaluated  Back pain/musculoskeletal or bowel? PA for Praluent was denied  Repatha is on formulary and [facility] specialty is stating would most likely get approved if pt changed to Repatha.  Repatha would still need a PA

## 2021-02-04 ENCOUNTER — IMMUNIZATIONS (OUTPATIENT)
Dept: FAMILY MEDICINE CLINIC | Facility: HOSPITAL | Age: 36
End: 2021-02-04

## 2021-02-04 DIAGNOSIS — Z23 ENCOUNTER FOR IMMUNIZATION: Primary | ICD-10-CM

## 2021-02-04 PROCEDURE — 91301 SARS-COV-2 / COVID-19 MRNA VACCINE (MODERNA) 100 MCG: CPT

## 2021-02-04 PROCEDURE — 0012A SARS-COV-2 / COVID-19 MRNA VACCINE (MODERNA) 100 MCG: CPT

## 2021-04-22 NOTE — PROGRESS NOTES
Diagnoses and all orders for this visit:    Hot flashes due to surgical menopause  -     Discontinue: estradiol (CLIMARA) 0 0375 MG/24HR; Place 1 patch on the skin once a week  -     estradiol (CLIMARA) 0 0375 MG/24HR; Place 1 patch on the skin once a week  RX printed for pt to take with her to [address]  She wanted to try patch as brand for a while to see if it sticks better  If not covered then she will continue with generic  All questions and concerns answered  Call with any questions  RTO for her Annual in Summer  Pleasant 38 y o  female presents today with c/o continued hot flashes but desires to switch to patch  States that her [address] will not prescribe for her due to concern for blood clots  We reviewed her history in detail  Denies cardiovascular, HTN,  DVT/PE, migraine with aura,  known blood disorder hx and never smoked  She previously has some bruising which she states is no longer an issue, denies varicosities and we reviewed s/s  to look out for  We reviewed benefits of patch vs oral use and will choose patch use to reduce risk of thrombophlebitis  She is aware risk is not eliminated, she is considered low risk  She describes hot flashes as very debilitating and pt becomes very symptomatic with palpitations and estradiol use helps with overall management  Vitals:    04/23/21 1410   BP: 124/70   Weight: 82 2 kg (181 lb 3 2 oz)   Height: 5' 5" (1 651 m)     Body mass index is 30 15 kg/m²      Allergies   Allergen Reactions    Zithromax [Azithromycin] Hives    Gabapentin Visual Disturbance       Past Medical History:   Diagnosis Date    Abnormal Pap smear of cervix     Chronic pain     Endometriosis     Fibromyalgia     Insomnia     Migraine      Past Surgical History:   Procedure Laterality Date    BACK SURGERY  2009    HYSTERECTOMY      LAPAROSCOPY      x2    NASAL POLYP SURGERY      VA LAP,LYSIS OF ADHESIONS N/A 4/30/2019    Procedure: LYSIS OF ADHESIONS, FULGERATION OF LESIONS;  Surgeon: Gualberto Hansen MD;  Location: [facility];  Service: Gynecology    [place] LAP,RMV  ADNEXAL STRUCTURE Bilateral 4/30/2019    Procedure: LAPAROSCOPIC OOPHERECTOMY;  Surgeon: Gualberto Hansen MD;  Location: [facility];  Service: Gynecology     Family History   Problem Relation Age of Onset    Diabetes Mother     Hypertension Mother     No Known Problems Father     Asthma Sister     Diabetes Brother     No Known Problems Daughter     No Known Problems Brother     No Known Problems Brother     Breast cancer Neg Hx     Colon cancer Neg Hx     Ovarian cancer Neg Hx     Cervical cancer Neg Hx     Uterine cancer Neg Hx      Social History     Tobacco Use    Smoking status: Never Smoker    Smokeless tobacco: Never Used   Substance Use Topics    Alcohol use: Not Currently     Comment: rarely     Drug use: No       Current Outpatient Medications:     conjugated estrogens (PREMARIN) vaginal cream, INSERT 1GM VAGINALLY AS DIRECTED AS NEEDED (INTO VAGINA FOR HORMONE REPLACEMENT), Disp: , Rfl:     Erenumab-aooe 70 MG/ML SOAJ, Inject 70 mg under the skin, Disp: , Rfl:     ergocalciferol (VITAMIN D2) 50,000 units, Take 50,000 Units by mouth, Disp: , Rfl:     ferrous sulfate 325 (65 Fe) mg tablet, Take 325 mg by mouth daily with breakfast, Disp: , Rfl:     lidocaine (LIDODERM) 5 %, APPLY 1 PATCH TOPICALLY DAILY AS NEEDED FOR PAIN (REMOVE PATCH AFTER 12 HOURS; 12 HOURS ON AND 12 HOURS OFF), Disp: , Rfl:     PARoxetine (PAXIL) 30 mg tablet, TAKE 30MG (1 TABLET) BY MOUTH EVERY EVENING FOR ANXIETY AND DEPRESSION, Disp: , Rfl:     zolpidem (AMBIEN) 5 mg tablet, TAKE ONE TABLET BY MOUTH AT BEDTIME AS NEEDED FOR SLEEP, Disp: , Rfl:     clotrimazole (LOTRIMIN) 1 % cream, Apply topically 2 (two) times a day for 14 days, Disp: 30 g, Rfl: 0    estradiol (CLIMARA) 0 0375 MG/24HR, Place 1 patch on the skin once a week, Disp: 9 patch, Rfl: 0    SUMAtriptan (IMITREX) 50 mg tablet, Take 50 mg by mouth once as needed Take one tab with onset of migraine- if no relief within 2 hours may take a sencond dose  MAX 2 doses daily, Disp: , Rfl:     OB History    Para Term  AB Living   3 3       3   SAB TAB Ectopic Multiple Live Births           3      # Outcome Date GA Lbr Arjun/2nd Weight Sex Delivery Anes PTL Lv   3 Para            2 Para            1 Para                Review of Systems     Review of Systems   Constitutional: Negative for chills, fatigue, fever and unexpected weight change  Respiratory: Negative for shortness of breath  Gastrointestinal: Negative for anal bleeding, blood in stool, constipation and diarrhea  Genitourinary: Negative for difficulty urinating, dysuria and hematuria  OBGyn Exam     Physical Exam   Constitutional: She appears well-developed and well-nourished  No distress  Head: Normocephalic  Neck: Normal range of motion  Neck supple  Pulmonary: Effort normal  Lung sounds clear  Cardiac: S1 & S2, reg rate & rhythm   Abdominal: Soft   Non-tender  Pelvic exam wasdeferred

## 2021-04-23 ENCOUNTER — OFFICE VISIT (OUTPATIENT)
Dept: OBGYN CLINIC | Facility: CLINIC | Age: 36
End: 2021-04-23
Payer: COMMERCIAL

## 2021-04-23 VITALS
HEIGHT: 65 IN | WEIGHT: 181.2 LBS | SYSTOLIC BLOOD PRESSURE: 124 MMHG | DIASTOLIC BLOOD PRESSURE: 70 MMHG | BODY MASS INDEX: 30.19 KG/M2

## 2021-04-23 DIAGNOSIS — E89.41 HOT FLASHES DUE TO SURGICAL MENOPAUSE: Primary | ICD-10-CM

## 2021-04-23 PROCEDURE — 99213 OFFICE O/P EST LOW 20 MIN: CPT | Performed by: NURSE PRACTITIONER

## 2021-04-23 RX ORDER — ESTRADIOL 0.04 MG/D
1 PATCH TRANSDERMAL WEEKLY
Qty: 9 PATCH | Refills: 0 | Status: SHIPPED | OUTPATIENT
Start: 2021-04-23

## 2021-04-23 RX ORDER — LIDOCAINE 50 MG/G
PATCH TOPICAL
COMMUNITY
Start: 2021-03-12

## 2021-04-23 RX ORDER — ZOLPIDEM TARTRATE 5 MG/1
TABLET ORAL
COMMUNITY
Start: 2021-03-08

## 2021-04-23 RX ORDER — ESTRADIOL 0.04 MG/D
1 PATCH TRANSDERMAL WEEKLY
Qty: 9 PATCH | Refills: 0 | Status: SHIPPED | OUTPATIENT
Start: 2021-04-23 | End: 2021-04-23 | Stop reason: SDUPTHER

## 2021-04-23 RX ORDER — PAROXETINE 30 MG/1
TABLET, FILM COATED ORAL
COMMUNITY
Start: 2021-03-11

## 2021-04-23 NOTE — PATIENT INSTRUCTIONS
Menopause   WHAT YOU NEED TO KNOW:   What is menopause? Menopause is a normal stage in a woman's life when her monthly periods stop  A woman who has not had a period for a full year after the age of 36 is considered to be in menopause  Menopause usually occurs between ages 52 to 48  Perimenopause is a stage before menopause that may cause signs and symptoms similar to menopause  Perimenopause may start about 4 years before menopause  What causes menopause? Menopause starts when the ovaries stop making the female hormones estrogen and progesterone  After menopause, a woman is no longer able to become pregnant  Any of the following may trigger menopause or early menopause:  · Older age    · Surgery, including a hysterectomy or oophorectomy    · Family history of early menopause    · Smoking    · Chemotherapy or pelvic radiation    · Chromosome abnormalities, including Stacy syndrome and Fragile X syndrome    What are the signs and symptoms of menopause? The signs and symptoms of menopause can be different from woman to woman:  · Irregular menstrual cycles with heavy vaginal bleeding followed by decreased bleeding until it stops    · Hot flashes (feeling warm, flushed, and sweaty)     · Vaginal changes such as increased dryness     · Mood changes such as anxiety, depression, or decreased desire to have sex    · Trouble sleeping, joint pain, headaches    · Brittle nails, hair on chin or chest where it is normally absent    · Decrease in breast size and change in skin texture    What do I need to know about menopause? · You can still get pregnant while you have periods  Continue to use birth control if you do not want to get pregnant  You may need to use birth control until it has been 1 year since your periods stopped  · Hormone replacement therapy (HRT) can be used to treat symptoms of menopause  HRT is medicine that replaces your low hormone levels  HRT contains estrogen and sometimes progestin   HRT has benefits and risks  HRT decreases your risk for bone fractures by helping to prevent osteoporosis  HRT also protects you from colon cancer  HRT may increase your risk for breast cancer, blood clots, heart disease, and stroke  Ask your healthcare provider if HRT is right for you  How can I care for myself? · Manage hot flashes  Hot flashes are brief periods of feeling very warm, flushed, and sweaty  Hot flashes can last from a few seconds to several minutes  They may happen many times during the day, and are common at night  Layer your clothing so that you can easily remove some clothing and cool yourself during a hot flash  Cold drinks may also be helpful  · Reduce vaginal dryness  by using over-the-counter vaginal creams  Vaginal dryness may cause you to have pain or discomfort during sex  Only use creams that are made for vaginal use  Do  not  use petroleum jelly  You may need an estrogen cream to put in and around your vagina  Estrogen cream may help decrease vaginal dryness and lower your risk of vaginal infections  · Continue to use birth control  during perimenopause if you do not want to get pregnant  You may need to use birth control until it has been 1 year since your periods stopped  Ask your healthcare provider when you can stop using birth control to prevent pregnancy  How can I live a healthy lifestyle during and after menopause? After menopause, your risk for heart disease and bone loss increases  Ask about these and other ways to stay healthy:  · Exercise regularly  Exercise helps you maintain a healthy weight  Exercise can also help to control your blood pressure and cholesterol levels  Include weight-bearing exercise for strong bones  Weight bearing exercise is recommended for at least 30 minutes, 3 times a week  Ask your healthcare provider about the best exercise plan for you  · Eat a variety of healthy foods    Include fruits, vegetables, whole grains (whole-wheat bread, pasta, and cereals), low-fat dairy, and lean protein foods (beans, poultry, and fish)  Limit foods high in sodium (salt)  Ask your healthcare provider for more information about a meal plan that is right for you  · Do not smoke  If you smoke, it is never too late to quit  You are more likely to have a heart attack, lung disease, blood clots, and cancer if you smoke  Ask your healthcare provider for information if you need help quitting  · Take supplements as directed  You may need extra calcium and vitamin D to help prevent osteoporosis  · Limit alcohol and caffeine  Alcohol and caffeine may worsen your symptoms  When should I contact my healthcare provider? · You have vaginal bleeding after menopause  · You have questions or concerns about your condition or care  CARE AGREEMENT:   You have the right to help plan your care  Learn about your health condition and how it may be treated  Discuss treatment options with your healthcare providers to decide what care you want to receive  You always have the right to refuse treatment  The above information is an  only  It is not intended as medical advice for individual conditions or treatments  Talk to your doctor, nurse or pharmacist before following any medical regimen to see if it is safe and effective for you  © Copyright [address] Information is for End User's use only and may not be sold, redistributed or otherwise used for commercial purposes   All illustrations and images included in CareNotes® are the copyrighted property of [employer]  or [address] [place] [address] [facility]

## 2021-05-03 ENCOUNTER — TELEPHONE (OUTPATIENT)
Dept: OBGYN CLINIC | Facility: CLINIC | Age: 36
End: 2021-05-03

## 2021-05-03 NOTE — TELEPHONE ENCOUNTER
Pt saw Jadiel Neighbours on 4/23  [facility] called from the [address] [facility] and had questions about Climara patch  Wants to know if this is ok to dispense- they have this pt with a hx of migraines but in Magdalene's note says that pt denies migraines  [facility], wants to know if this should be d/c'd due to patient starting patch  Please call [facility] today so she can order if this is all ok  She leaves at 4:30 today  Her number is [phone] ext [phone]

## 2021-05-03 NOTE — TELEPHONE ENCOUNTER
I called Aime Oh back at her extension, she must have left for the day, I called right at 4:30  I left detailed message that it was ok for her to use Climara patch, told her yes, she does not have hx of migraines with aura  She did not ask for vaginal cream use during my visit, so I would say do not fill  Told her I was off on Tuesday's and if she had additional questions I will return on Wednesday

## 2021-05-05 ENCOUNTER — TELEPHONE (OUTPATIENT)
Dept: OBGYN CLINIC | Facility: CLINIC | Age: 36
End: 2021-05-05

## 2021-05-05 NOTE — TELEPHONE ENCOUNTER
Raven Isaacs from [address] said the rx for [address] says "brand necessary"   I cannot see this on her rx  I do not see that she was on generics in past and they would prefer to give generics  IIf she has been on generics and has "rash" or other reason for brand - will try to cover it  Pharmacist phone # [phone]  Ext [phone]    Thx

## 2021-05-05 NOTE — TELEPHONE ENCOUNTER
I did get thru and lm on [employer] voice mail - needs brand as generic falls off and causes jessica Mcgrath to call back with answer

## 2021-05-05 NOTE — TELEPHONE ENCOUNTER
I took call from Black with the [facility]  They are approving pt medication and instead of dx  hot flashed due to chronic menopause  They are placing it under-sx premature menopause  They are going to approve the patch and order it on their end for the patient and once its received they will send it directly to the patient themselves

## 2022-02-28 ENCOUNTER — OFFICE VISIT (OUTPATIENT)
Dept: GASTROENTEROLOGY | Facility: CLINIC | Age: 37
End: 2022-02-28
Payer: OTHER GOVERNMENT

## 2022-02-28 VITALS
BODY MASS INDEX: 31.32 KG/M2 | WEIGHT: 188 LBS | DIASTOLIC BLOOD PRESSURE: 78 MMHG | HEIGHT: 65 IN | SYSTOLIC BLOOD PRESSURE: 112 MMHG

## 2022-02-28 DIAGNOSIS — R10.13 EPIGASTRIC PAIN: Primary | ICD-10-CM

## 2022-02-28 PROCEDURE — 99204 OFFICE O/P NEW MOD 45 MIN: CPT | Performed by: INTERNAL MEDICINE

## 2022-02-28 RX ORDER — OMEPRAZOLE 20 MG/1
CAPSULE, DELAYED RELEASE ORAL
COMMUNITY
Start: 2022-02-07

## 2022-02-28 RX ORDER — DOXYCYCLINE HYCLATE 100 MG
TABLET ORAL
COMMUNITY
Start: 2022-02-07

## 2022-02-28 RX ORDER — BISMUTH SUBSALICYLATE 262 MG/1
TABLET, CHEWABLE ORAL
COMMUNITY
Start: 2022-02-07

## 2022-02-28 RX ORDER — METRONIDAZOLE 250 MG/1
TABLET ORAL
COMMUNITY
Start: 2022-02-07

## 2022-02-28 RX ORDER — CLINDAMYCIN AND BENZOYL PEROXIDE 10; 50 MG/G; MG/G
GEL TOPICAL
COMMUNITY
Start: 2021-09-10

## 2022-02-28 NOTE — PROGRESS NOTES
[facility] - Outpatient Consultation  Carli Cassidy 43 y o  female MRN: [id number]  Encounter: [id number]          ASSESSMENT AND PLAN:      1  Epigastric pain the    There is no indication for repeat esophagogastroduodenoscopy at this time  Continue with quadruple drug therapy to include bismuth, doxycycline, metronidazole, and omeprazole as prescribed    Once a quadruple therapy is complete then the patient should continue with omeprazole 20 mg p o  b i d  Begin Linzess 72 mcg daily I told her that this doses can be increased to 145 verses 290 mcg daily    Initially I offered MiraLax but she stated that she did want a makes MiraLax with water and drink it on a daily basis     I advised her to stop taking her over-the-counter laxatives    ______________________________________________________________________    HPI:  This 75-year-old female comes the office today with a complaint of ongoing problems with bloating and epigastric abdominal pain  These symptoms have improved since she had undergone esophagogastroduodenoscopy as well as colonoscopy about 2 weeks ago  Colonoscopy was reported to be unremarkable  The EGD demonstrated Helicobacter pylori gastritis  She was placed on quadruple therapy with bismuth, doxycycline, metronidazole, and omeprazole  She still taking this quadruple therapy  The actual date of her procedure was February 3, 2022  She states she can typically go 4-5 days without a bowel movement  This has been a longstanding problem for many years  She does have occasional episodes of heartburn  She denies any hematemesis or rectal bleeding  The last time she saw rectal bleeding was years ago  She has been taking over-the-counter laxative on a daily basis or as needed  She admits to epigastric abdominal pain  Her omeprazole is 20 mg twice daily  She denies dysphagia or odynophagia        REVIEW OF SYSTEMS:    CONSTITUTIONAL: Denies any fever, chills, rigors, and weight loss  HEENT: No earache or tinnitus  Denies hearing loss or visual disturbances  CARDIOVASCULAR: No chest pain or palpitations  RESPIRATORY: Denies any cough, hemoptysis, shortness of breath or dyspnea on exertion  GASTROINTESTINAL: As noted in the History of Present Illness  GENITOURINARY: No problems with urination  Denies any hematuria or dysuria  NEUROLOGIC: No dizziness or vertigo, denies headaches  MUSCULOSKELETAL: Denies any muscle or joint pain  SKIN: Denies skin rashes or itching  ENDOCRINE: Denies excessive thirst  Denies intolerance to heat or cold  PSYCHOSOCIAL: Denies depression or anxiety  Denies any recent memory loss         Historical Information   Past Medical History:   Diagnosis Date    Abnormal Pap smear of cervix     Chronic pain     Endometriosis     Fibromyalgia     Insomnia     Migraine      Past Surgical History:   Procedure Laterality Date    BACK SURGERY  2009    HYSTERECTOMY      LAPAROSCOPY      x2    NASAL POLYP SURGERY      [place] LAP,LYSIS OF ADHESIONS N/A 4/30/2019    Procedure: LYSIS OF ADHESIONS, FULGERATION OF LESIONS;  Surgeon: Alexander Monk MD;  [facility];  Service: Gynecology    IL LAP,RMV  ADNEXAL STRUCTURE Bilateral 4/30/2019    Procedure: Rhonda Deep;  Surgeon: Alexander Monk MD;  [facility];  Service: Gynecology     Social History   Social History     Substance and Sexual Activity   Alcohol Use Not Currently    Comment: rarely      Social History     Substance and Sexual Activity   Drug Use No     Social History     Tobacco Use   Smoking Status Never Smoker   Smokeless Tobacco Never Used     Family History   Problem Relation Age of Onset    Diabetes Mother     Hypertension Mother     No Known Problems Father     Asthma Sister     Diabetes Brother     No Known Problems Daughter     No Known Problems Brother     No Known Problems Brother     Breast cancer Neg Hx     Colon cancer Neg Hx     Ovarian cancer Neg Hx     Cervical cancer Neg Hx     Uterine cancer Neg Hx        Meds/Allergies       Current Outpatient Medications:     bismuth subsalicylate (PEPTO BISMOL) 262 MG chewable tablet    clindamycin-benzoyl peroxide (BENZACLIN) gel    conjugated estrogens (PREMARIN) vaginal cream    doxycycline hyclate (VIBRA-TABS) 100 mg tablet    Erenumab-aooe 70 MG/ML SOAJ    ergocalciferol (VITAMIN D2) 50,000 units    estradiol (CLIMARA) 0 0375 MG/24HR    ferrous sulfate 325 (65 Fe) mg tablet    lidocaine (LIDODERM) 5 %    metroNIDAZOLE (FLAGYL) 250 mg tablet    omeprazole (PriLOSEC) 20 mg delayed release capsule    PARoxetine (PAXIL) 30 mg tablet    SUMAtriptan (IMITREX) 50 mg tablet    zolpidem (AMBIEN) 5 mg tablet    clotrimazole (LOTRIMIN) 1 % cream    Allergies   Allergen Reactions    Zithromax [Azithromycin] Hives    Gabapentin Visual Disturbance           Objective     Blood pressure 112/78, height 5' 5" (1 651 m), weight 85 3 kg (188 lb), not currently breastfeeding  Body mass index is 31 28 kg/m²  PHYSICAL EXAM:      General Appearance:   Alert, cooperative, no distress   HEENT:   Normocephalic, atraumatic, anicteric      Neck:  Supple, symmetrical, trachea midline   Lungs:   Clear to auscultation bilaterally; no rales, rhonchi or wheezing; respirations unlabored    Heart[de-identified]   Regular rate and rhythm; no murmur, rub, or gallop  Abdomen:   Soft, non-tender, non-distended; normal bowel sounds; no masses, no organomegaly    Genitalia:   Deferred    Rectal:   Deferred    Extremities:  No cyanosis, clubbing or edema    Pulses:  2+ and symmetric    Skin:  No jaundice, rashes, or lesions    Lymph nodes:  No palpable cervical lymphadenopathy        Lab Results:   No visits with results within 1 Day(s) from this visit     Latest known visit with results is:   Office Visit on 03/27/2020   Component Date Value    Urine Culture 03/27/2020 <10,000 cfu/ml      Clarity, UA 03/27/2020 Clear     Color, UA 03/27/2020 Dk Yellow     Specific Gravity, UA 03/27/2020 1 025     pH, UA 03/27/2020 6 0     Glucose, UA 03/27/2020 Negative     Ketones, UA 03/27/2020 Negative     Blood, UA 03/27/2020 Negative     Protein, UA 03/27/2020 Negative     Nitrite, UA 03/27/2020 Negative     Bilirubin, UA 03/27/2020 Negative     Urobilinogen, UA 03/27/2020 0 2     Leukocytes, UA 03/27/2020 Trace*    WBC, UA 03/27/2020 4-10*    RBC, UA 03/27/2020 None Seen     Hyaline Casts, UA 03/27/2020 3-5*    Bacteria, UA 03/27/2020 None Seen     Epithelial Cells 03/27/2020 Occasional          Radiology Results:   No results found

## 2022-02-28 NOTE — LETTER
March 1, 2022     Rosa Duval, [address]    Patient: Denzel Pelletier   YOB: 1985   Date of Visit: 2/28/2022       Dear Dr Kriste Babinski: Thank you for referring Denzel Pelletier to me for evaluation  Below are my notes for this consultation  If you have questions, please do not hesitate to call me  I look forward to following your patient along with you  Sincerely,        Serenity Arambula DO        CC: No Recipients  Serenity Arambula DO  2/28/2022  6:00 PM  Signed  [facility] - Outpatient Consultation  Denzel Pelletier 43 y o  female MRN: [id number]  Encounter: [id number]          ASSESSMENT AND PLAN:      1  Epigastric pain the    There is no indication for repeat esophagogastroduodenoscopy at this time  Continue with quadruple drug therapy to include bismuth, doxycycline, metronidazole, and omeprazole as prescribed    Once a quadruple therapy is complete then the patient should continue with omeprazole 20 mg p o  b i d  Begin Linzess 72 mcg daily I told her that this doses can be increased to 145 verses 290 mcg daily    Initially I offered MiraLax but she stated that she did want a makes MiraLax with water and drink it on a daily basis     I advised her to stop taking her over-the-counter laxatives    ______________________________________________________________________    HPI:  This 40-year-old female comes the office today with a complaint of ongoing problems with bloating and epigastric abdominal pain  These symptoms have improved since she had undergone esophagogastroduodenoscopy as well as colonoscopy about 2 weeks ago  Colonoscopy was reported to be unremarkable  The EGD demonstrated Helicobacter pylori gastritis  She was placed on quadruple therapy with bismuth, doxycycline, metronidazole, and omeprazole  She still taking this quadruple therapy    The actual date of her procedure was February 3, 2022  She states she can typically go 4-5 days without a bowel movement  This has been a longstanding problem for many years  She does have occasional episodes of heartburn  She denies any hematemesis or rectal bleeding  The last time she saw rectal bleeding was years ago  She has been taking over-the-counter laxative on a daily basis or as needed  She admits to epigastric abdominal pain  Her omeprazole is 20 mg twice daily  She denies dysphagia or odynophagia  REVIEW OF SYSTEMS:    CONSTITUTIONAL: Denies any fever, chills, rigors, and weight loss  HEENT: No earache or tinnitus  Denies hearing loss or visual disturbances  CARDIOVASCULAR: No chest pain or palpitations  RESPIRATORY: Denies any cough, hemoptysis, shortness of breath or dyspnea on exertion  GASTROINTESTINAL: As noted in the History of Present Illness  GENITOURINARY: No problems with urination  Denies any hematuria or dysuria  NEUROLOGIC: No dizziness or vertigo, denies headaches  MUSCULOSKELETAL: Denies any muscle or joint pain  SKIN: Denies skin rashes or itching  ENDOCRINE: Denies excessive thirst  Denies intolerance to heat or cold  PSYCHOSOCIAL: Denies depression or anxiety  Denies any recent memory loss         Historical Information   Past Medical History:   Diagnosis Date    Abnormal Pap smear of cervix     Chronic pain     Endometriosis     Fibromyalgia     Insomnia     Migraine      Past Surgical History:   Procedure Laterality Date    BACK SURGERY  2009    HYSTERECTOMY      LAPAROSCOPY      x2    NASAL POLYP SURGERY      [place] LAP,LYSIS OF ADHESIONS N/A 4/30/2019    Procedure: LYSIS OF ADHESIONS, FULGERATION OF LESIONS;  Surgeon: Olivia Hurley MD;  [facility];  Service: Gynecology    CO LAP,RMV  ADNEXAL STRUCTURE Bilateral 4/30/2019    Procedure: LAPAROSCOPIC OOPHERECTOMY;  Surgeon: Olivia Hurley MD;  [facility];  Service: Gynecology     Social History   Social History Substance and Sexual Activity   Alcohol Use Not Currently    Comment: rarely      Social History     Substance and Sexual Activity   Drug Use No     Social History     Tobacco Use   Smoking Status Never Smoker   Smokeless Tobacco Never Used     Family History   Problem Relation Age of Onset    Diabetes Mother     Hypertension Mother     No Known Problems Father     Asthma Sister     Diabetes Brother     No Known Problems Daughter     No Known Problems Brother     No Known Problems Brother     Breast cancer Neg Hx     Colon cancer Neg Hx     Ovarian cancer Neg Hx     Cervical cancer Neg Hx     Uterine cancer Neg Hx        Meds/Allergies       Current Outpatient Medications:     bismuth subsalicylate (PEPTO BISMOL) 262 MG chewable tablet    clindamycin-benzoyl peroxide (BENZACLIN) gel    conjugated estrogens (PREMARIN) vaginal cream    doxycycline hyclate (VIBRA-TABS) 100 mg tablet    Erenumab-aooe 70 MG/ML SOAJ    ergocalciferol (VITAMIN D2) 50,000 units    estradiol (CLIMARA) 0 0375 MG/24HR    ferrous sulfate 325 (65 Fe) mg tablet    lidocaine (LIDODERM) 5 %    metroNIDAZOLE (FLAGYL) 250 mg tablet    omeprazole (PriLOSEC) 20 mg delayed release capsule    PARoxetine (PAXIL) 30 mg tablet    SUMAtriptan (IMITREX) 50 mg tablet    zolpidem (AMBIEN) 5 mg tablet    clotrimazole (LOTRIMIN) 1 % cream    Allergies   Allergen Reactions    Zithromax [Azithromycin] Hives    Gabapentin Visual Disturbance           Objective     Blood pressure 112/78, height 5' 5" (1 651 m), weight 85 3 kg (188 lb), not currently breastfeeding  Body mass index is 31 28 kg/m²  PHYSICAL EXAM:      General Appearance:   Alert, cooperative, no distress   HEENT:   Normocephalic, atraumatic, anicteric      Neck:  Supple, symmetrical, trachea midline   Lungs:   Clear to auscultation bilaterally; no rales, rhonchi or wheezing; respirations unlabored    Heart[de-identified]   Regular rate and rhythm; no murmur, rub, or gallop  Abdomen:   Soft, non-tender, non-distended; normal bowel sounds; no masses, no organomegaly    Genitalia:   Deferred    Rectal:   Deferred    Extremities:  No cyanosis, clubbing or edema    Pulses:  2+ and symmetric    Skin:  No jaundice, rashes, or lesions    Lymph nodes:  No palpable cervical lymphadenopathy        Lab Results:   No visits with results within 1 Day(s) from this visit  Latest known visit with results is:   Office Visit on 03/27/2020   Component Date Value    Urine Culture 03/27/2020 <10,000 cfu/ml      Clarity, UA 03/27/2020 Clear     Color, UA 03/27/2020 Dk Yellow     Specific Gravity, UA 03/27/2020 1 025     pH, UA 03/27/2020 6 0     Glucose, UA 03/27/2020 Negative     Ketones, UA 03/27/2020 Negative     Blood, UA 03/27/2020 Negative     Protein, UA 03/27/2020 Negative     Nitrite, UA 03/27/2020 Negative     Bilirubin, UA 03/27/2020 Negative     Urobilinogen, UA 03/27/2020 0 2     Leukocytes, UA 03/27/2020 Trace*    WBC, UA 03/27/2020 4-10*    RBC, UA 03/27/2020 None Seen     Hyaline Casts, UA 03/27/2020 3-5*    Bacteria, UA 03/27/2020 None Seen     Epithelial Cells 03/27/2020 Occasional          Radiology Results:   No results found

## 2022-03-18 DIAGNOSIS — K58.1 IRRITABLE BOWEL SYNDROME WITH CONSTIPATION: Primary | ICD-10-CM

## 2022-03-18 DIAGNOSIS — R10.13 EPIGASTRIC PAIN: ICD-10-CM

## 2022-03-18 RX ORDER — LUBIPROSTONE 8 UG/1
8 CAPSULE, GELATIN COATED ORAL 2 TIMES DAILY WITH MEALS
Qty: 60 CAPSULE | Refills: 2 | Status: SHIPPED | OUTPATIENT
Start: 2022-03-18 | End: 2022-04-20 | Stop reason: SDUPTHER

## 2022-03-18 RX ORDER — LUBIPROSTONE 8 UG/1
8 CAPSULE, GELATIN COATED ORAL 2 TIMES DAILY WITH MEALS
Qty: 60 CAPSULE | Refills: 2 | Status: SHIPPED | OUTPATIENT
Start: 2022-03-18 | End: 2022-03-18 | Stop reason: SDUPTHER

## 2022-03-18 NOTE — TELEPHONE ENCOUNTER
Spoke with [facility]  Patients [facility] states linzess I snot on formulary Amitiza is  We will send Amitiza 8mcg BID

## 2022-04-20 DIAGNOSIS — K58.1 IRRITABLE BOWEL SYNDROME WITH CONSTIPATION: ICD-10-CM

## 2022-04-20 DIAGNOSIS — K59.09 CHRONIC CONSTIPATION: Primary | ICD-10-CM

## 2022-04-20 RX ORDER — LUBIPROSTONE 8 UG/1
24 CAPSULE, GELATIN COATED ORAL 2 TIMES DAILY WITH MEALS
Qty: 180 CAPSULE | Refills: 2 | Status: SHIPPED | OUTPATIENT
Start: 2022-04-20 | End: 2022-07-19

## 2022-04-20 NOTE — TELEPHONE ENCOUNTER
Spoke with patient  History of epigastric pain, constipation    Patient c/o worsening constipation since starting Amitiza 8mcg BID, generalized abdominal bloating, and diarrhea when she taking OTC laxatives  Taking omeprazole 20mg BID, ducolax suppositories PRN, and magnesium citrate PRN  Encouraged patient to drink at least 60oz of fluid a day  Any suggestions? Would you like to increase amitiza to 24mcg BID?

## 2022-04-20 NOTE — TELEPHONE ENCOUNTER
Yes pls - if 24BID does not work we will have to see what else the [place] will pay for - ?  Trulance

## 2022-04-20 NOTE — TELEPHONE ENCOUNTER
Barbie patient - Patient works at the [employer] and will be in work within 1/2 hour please [facility] call to [phone]  Patient is having a lot of diarrhea/constipation back and forth    Thx

## 2023-05-08 ENCOUNTER — TELEPHONE (OUTPATIENT)
Dept: GASTROENTEROLOGY | Facility: CLINIC | Age: 38
End: 2023-05-08

## 2023-05-15 ENCOUNTER — TELEPHONE (OUTPATIENT)
Dept: GASTROENTEROLOGY | Facility: CLINIC | Age: 38
End: 2023-05-15

## 2023-06-30 RX ORDER — SELENIUM 50 MCG
TABLET ORAL
COMMUNITY
Start: 2023-01-25

## 2023-06-30 RX ORDER — METHYLPREDNISOLONE 4 MG/1
TABLET ORAL
COMMUNITY
Start: 2023-01-31

## 2023-06-30 RX ORDER — MELOXICAM 15 MG/1
15 TABLET ORAL DAILY
COMMUNITY
Start: 2023-02-10

## 2023-07-05 ENCOUNTER — OFFICE VISIT (OUTPATIENT)
Dept: GASTROENTEROLOGY | Facility: CLINIC | Age: 38
End: 2023-07-05
Payer: OTHER GOVERNMENT

## 2023-07-05 VITALS
HEART RATE: 69 BPM | SYSTOLIC BLOOD PRESSURE: 111 MMHG | OXYGEN SATURATION: 97 % | DIASTOLIC BLOOD PRESSURE: 76 MMHG | HEIGHT: 65 IN | WEIGHT: 180 LBS | BODY MASS INDEX: 29.99 KG/M2

## 2023-07-05 DIAGNOSIS — R79.89 ELEVATED LFTS: ICD-10-CM

## 2023-07-05 DIAGNOSIS — R10.13 EPIGASTRIC PAIN: Primary | ICD-10-CM

## 2023-07-05 PROCEDURE — 99214 OFFICE O/P EST MOD 30 MIN: CPT | Performed by: INTERNAL MEDICINE

## 2023-07-05 RX ORDER — TOPIRAMATE 100 MG/1
100 TABLET, FILM COATED ORAL 2 TIMES DAILY
COMMUNITY

## 2023-07-05 NOTE — PROGRESS NOTES
[facility] - Outpatient Consultation  Debra Blanco 42 y.o. female MRN: [id number]  Encounter: [id number]          ASSESSMENT AND PLAN:      1. Epigastric pain  - EGD; Future  - Hepatic function panel; Future  - History of H pylori  - No medical therapy at this time    2. Elevated LFTs  -liver enzyme panel  -Liver enzymes not available for review in the patients chart    ______________________________________________________________________    HPI:  Keon Spears returns to the office continuing to complain of upper abdomina pain. She does have a history of H pylori that was previously treated however, no followup testing was apparently performed to identify whether or not the treatment was successful. She states that the pain is a pressure. She admits to nausea, but denies vomiting. There is no heartburn, diarrhea, constipation, but she does admit to bloating. She states that nothing in particular seems to provoke the pains and nothing is palliative. She states that it is non-radiating and the severity is mild to severe. There is temporal pattern to the pain. [facility] with no apparent significant findings. She has a BM every 3 days. The abdominal pain comes and goes. Her uncle was diagnosed with colon cancer. REVIEW OF SYSTEMS:    CONSTITUTIONAL: Denies any fever, chills, rigors, and weight loss. HEENT: No earache or tinnitus. Denies hearing loss or visual disturbances. CARDIOVASCULAR: No chest pain or palpitations. RESPIRATORY: Denies any cough, hemoptysis, shortness of breath or dyspnea on exertion. GASTROINTESTINAL: As noted in the History of Present Illness. GENITOURINARY: No problems with urination. Denies any hematuria or dysuria. NEUROLOGIC: No dizziness or vertigo, denies headaches. MUSCULOSKELETAL: Denies any muscle or joint pain. SKIN: Denies skin rashes or itching.    ENDOCRINE: Denies excessive thirst. Denies intolerance to heat or cold. PSYCHOSOCIAL: Denies depression or anxiety. Denies any recent memory loss.        Historical Information   Past Medical History:   Diagnosis Date   • Abnormal Pap smear of cervix    • Chronic pain    • Endometriosis    • Fibromyalgia    • Insomnia    • Migraine      Past Surgical History:   Procedure Laterality Date   • BACK SURGERY  2009   • HYSTERECTOMY     • LAPAROSCOPY      x2   • NASAL POLYP SURGERY     • [place] LAPAROSCOPY W/LYSIS OF ADHESIONS N/A 4/30/2019    Procedure: LYSIS OF ADHESIONS, FULGERATION OF LESIONS;  Surgeon: Annie Moise MD;  [facility]: [facility];  Service: Gynecology   • [place] LAPAROSCOPY W/RMVL ADNEXAL STRUCTURES Bilateral 4/30/2019    Procedure: LAPAROSCOPIC OOPHERECTOMY;  Surgeon: Annie Moise MD;  Location[facility];  Service: Gynecology     Social History   Social History     Substance and Sexual Activity   Alcohol Use Not Currently    Comment: rarely      Social History     Substance and Sexual Activity   Drug Use No     Social History     Tobacco Use   Smoking Status Never   Smokeless Tobacco Never     Family History   Problem Relation Age of Onset   • Diabetes Mother    • Hypertension Mother    • No Known Problems Father    • Asthma Sister    • Diabetes Brother    • No Known Problems Daughter    • No Known Problems Brother    • No Known Problems Brother    • Breast cancer Neg Hx    • Colon cancer Neg Hx    • Ovarian cancer Neg Hx    • Cervical cancer Neg Hx    • Uterine cancer Neg Hx        Meds/Allergies       Current Outpatient Medications:   •  Adalimumab 40 MG/0.8ML PNKT  •  clindamycin-benzoyl peroxide (BENZACLIN) gel  •  conjugated estrogens (PREMARIN) vaginal cream  •  lactobacillus acidophilus  •  lidocaine (LIDODERM) 5 %  •  lubiprostone (AMITIZA) 8 mcg capsule  •  methylprednisolone (MEDROL) 4 mg tablet  •  omeprazole (PriLOSEC) 20 mg delayed release capsule  •  SUMAtriptan (IMITREX) 50 mg tablet  • topiramate (Topamax) 100 mg tablet  •  bismuth subsalicylate (PEPTO BISMOL) 262 MG chewable tablet  •  clotrimazole (LOTRIMIN) 1 % cream  •  doxycycline hyclate (VIBRA-TABS) 100 mg tablet  •  Erenumab-aooe 70 MG/ML SOAJ  •  ergocalciferol (VITAMIN D2) 50,000 units  •  estradiol (CLIMARA) 0.0375 MG/24HR  •  ferrous sulfate 325 (65 Fe) mg tablet  •  meloxicam (MOBIC) 15 mg tablet  •  metroNIDAZOLE (FLAGYL) 250 mg tablet  •  PARoxetine (PAXIL) 30 mg tablet  •  zolpidem (AMBIEN) 5 mg tablet    Allergies   Allergen Reactions   • Wound Dressing Adhesive Angioedema, Itching and Hives     Felt like throat was closing   • Zithromax [Azithromycin] Hives   • Gabapentin Visual Disturbance   • Medical Tape Rash           Objective     Blood pressure 111/76, pulse 69, height 5' 5" (1.651 m), weight 81.6 kg (180 lb), SpO2 97 %, not currently breastfeeding. Body mass index is 29.95 kg/m². PHYSICAL EXAM:      General Appearance:   Alert, cooperative, no distress   HEENT:   Normocephalic, atraumatic, anicteric.     Neck:  Supple, symmetrical, trachea midline   Lungs:   Clear to auscultation bilaterally; no rales, rhonchi or wheezing; respirations unlabored    Heart[de-identified]   Regular rate and rhythm; no murmur, rub, or gallop. Abdomen:   Soft, non-tender, non-distended; normal bowel sounds; no masses, no organomegaly    Genitalia:   Deferred    Rectal:   Deferred    Extremities:  No cyanosis, clubbing or edema    Pulses:  2+ and symmetric    Skin:  No jaundice, rashes, or lesions    Lymph nodes:  No palpable cervical lymphadenopathy        Lab Results:   No visits with results within 1 Day(s) from this visit.    Latest known visit with results is:   Office Visit on 03/27/2020   Component Date Value   • Urine Culture 03/27/2020 <10,000 cfu/ml    • Clarity, UA 03/27/2020 Clear    • Color, UA 03/27/2020 Dk Yellow    • Specific Gravity, UA 03/27/2020 1.025    • pH, UA 03/27/2020 6.0    • Glucose, UA 03/27/2020 Negative    • Ketones, UA 03/27/2020 Negative    • Occult Blood, UA 03/27/2020 Negative    • Protein, UA 03/27/2020 Negative    • Nitrite, UA 03/27/2020 Negative    • Bilirubin, UA 03/27/2020 Negative    • Urobilinogen, UA 03/27/2020 0.2    • Leukocytes, UA 03/27/2020 Trace (A)    • WBC, UA 03/27/2020 4-10 (A)    • RBC, UA 03/27/2020 None Seen    • Hyaline Casts, UA 03/27/2020 3-5 (A)    • Bacteria, UA 03/27/2020 None Seen    • Epithelial Cells 03/27/2020 Occasional          Radiology Results:   No results found.

## 2023-07-05 NOTE — PATIENT INSTRUCTIONS
Scheduled date of EGD(as of today):7/14/23  Physician performing EGD:Emanuel  Location of EGD:[place]  Instructions reviewed with patient by:Ralf riley  Clearances:  none

## 2023-07-14 ENCOUNTER — ANESTHESIA EVENT (OUTPATIENT)
Dept: GASTROENTEROLOGY | Facility: HOSPITAL | Age: 38
End: 2023-07-14

## 2023-07-14 ENCOUNTER — HOSPITAL ENCOUNTER (OUTPATIENT)
Dept: GASTROENTEROLOGY | Facility: HOSPITAL | Age: 38
Setting detail: OUTPATIENT SURGERY
Discharge: HOME | End: 2023-07-14
Attending: INTERNAL MEDICINE
Payer: COMMERCIAL

## 2023-07-14 ENCOUNTER — ANESTHESIA (OUTPATIENT)
Dept: GASTROENTEROLOGY | Facility: HOSPITAL | Age: 38
End: 2023-07-14

## 2023-07-14 VITALS
DIASTOLIC BLOOD PRESSURE: 82 MMHG | RESPIRATION RATE: 18 BRPM | TEMPERATURE: 98 F | HEART RATE: 83 BPM | OXYGEN SATURATION: 100 % | SYSTOLIC BLOOD PRESSURE: 113 MMHG

## 2023-07-14 DIAGNOSIS — R10.13 EPIGASTRIC PAIN: ICD-10-CM

## 2023-07-14 PROCEDURE — 88305 TISSUE EXAM BY PATHOLOGIST: CPT | Performed by: STUDENT IN AN ORGANIZED HEALTH CARE EDUCATION/TRAINING PROGRAM

## 2023-07-14 PROCEDURE — 88112 CYTOPATH CELL ENHANCE TECH: CPT | Performed by: STUDENT IN AN ORGANIZED HEALTH CARE EDUCATION/TRAINING PROGRAM

## 2023-07-14 PROCEDURE — 88342 IMHCHEM/IMCYTCHM 1ST ANTB: CPT | Performed by: STUDENT IN AN ORGANIZED HEALTH CARE EDUCATION/TRAINING PROGRAM

## 2023-07-14 PROCEDURE — 43239 EGD BIOPSY SINGLE/MULTIPLE: CPT | Performed by: INTERNAL MEDICINE

## 2023-07-14 RX ORDER — PROPOFOL 10 MG/ML
INJECTION, EMULSION INTRAVENOUS AS NEEDED
Status: DISCONTINUED | OUTPATIENT
Start: 2023-07-14 | End: 2023-07-14

## 2023-07-14 RX ORDER — SODIUM CHLORIDE, SODIUM LACTATE, POTASSIUM CHLORIDE, CALCIUM CHLORIDE 600; 310; 30; 20 MG/100ML; MG/100ML; MG/100ML; MG/100ML
INJECTION, SOLUTION INTRAVENOUS CONTINUOUS PRN
Status: DISCONTINUED | OUTPATIENT
Start: 2023-07-14 | End: 2023-07-14

## 2023-07-14 RX ORDER — LIDOCAINE HYDROCHLORIDE 20 MG/ML
INJECTION, SOLUTION EPIDURAL; INFILTRATION; INTRACAUDAL; PERINEURAL AS NEEDED
Status: DISCONTINUED | OUTPATIENT
Start: 2023-07-14 | End: 2023-07-14

## 2023-07-14 RX ADMIN — PROPOFOL 50 MG: 10 INJECTION, EMULSION INTRAVENOUS at 14:20

## 2023-07-14 RX ADMIN — LIDOCAINE HYDROCHLORIDE 60 MG: 20 INJECTION, SOLUTION EPIDURAL; INFILTRATION; INTRACAUDAL; PERINEURAL at 14:15

## 2023-07-14 RX ADMIN — PROPOFOL 150 MG: 10 INJECTION, EMULSION INTRAVENOUS at 14:15

## 2023-07-14 RX ADMIN — PROPOFOL 50 MG: 10 INJECTION, EMULSION INTRAVENOUS at 14:18

## 2023-07-14 RX ADMIN — SODIUM CHLORIDE, SODIUM LACTATE, POTASSIUM CHLORIDE, AND CALCIUM CHLORIDE: .6; .31; .03; .02 INJECTION, SOLUTION INTRAVENOUS at 13:02

## 2023-07-14 NOTE — H&P
History and Physical - [facility]  Ada Berg 42 y.o. female MRN: [id number]      HPI: Ada Berg is a 42y.o. year old female who presents for evaluation of abdominal pain      REVIEW OF SYSTEMS: Per the HPI, and otherwise unremarkable.     Historical Information   Past Medical History:   Diagnosis Date   • Abnormal Pap smear of cervix    • Chronic pain    • Endometriosis    • Fibromyalgia    • Insomnia    • Migraine      Past Surgical History:   Procedure Laterality Date   • BACK SURGERY  2009   • HYSTERECTOMY     • LAPAROSCOPY      x2   • NASAL POLYP SURGERY     • [place] LAPAROSCOPY W/LYSIS OF ADHESIONS N/A 4/30/2019    Procedure: LYSIS OF ADHESIONS, FULGERATION OF LESIONS;  Surgeon: Liyah Roberts MD;  [facility];  Service: Gynecology   • MD LAPAROSCOPY W/RMVL ADNEXAL STRUCTURES Bilateral 4/30/2019    Procedure: Miranda Carvajal;  Surgeon: Liyah Roberts MD;  [facility];  Service: Gynecology     Social History   Social History     Substance and Sexual Activity   Alcohol Use Not Currently    Comment: rarely      Social History     Substance and Sexual Activity   Drug Use No     Social History     Tobacco Use   Smoking Status Never   Smokeless Tobacco Never     Family History   Problem Relation Age of Onset   • Diabetes Mother    • Hypertension Mother    • No Known Problems Father    • Asthma Sister    • Diabetes Brother    • No Known Problems Daughter    • No Known Problems Brother    • No Known Problems Brother    • Breast cancer Neg Hx    • Colon cancer Neg Hx    • Ovarian cancer Neg Hx    • Cervical cancer Neg Hx    • Uterine cancer Neg Hx        Meds/Allergies     (Not in a hospital admission)      Allergies   Allergen Reactions   • Wound Dressing Adhesive Angioedema, Itching and Hives     Felt like throat was closing   • Zithromax [Azithromycin] Hives   • Gabapentin Visual Disturbance   • Medical Tape Rash       Objective Blood pressure 103/73, pulse 76, temperature 98.8 °F (37.1 °C), temperature source Temporal, resp. rate 16, SpO2 100 %, not currently breastfeeding. PHYSICAL EXAM    Gen: NAD  CV: RRR  CHEST: Clear  ABD: soft, NT/ND  EXT: no edema      ASSESSMENT/PLAN:  This is a 42y.o. year old female here for EGD with biopsies, and she is stable and optimized for her procedure.

## 2023-07-14 NOTE — ANESTHESIA POSTPROCEDURE EVALUATION
Post-Op Assessment Note    CV Status:  Stable  Pain Score: 0    Pain management: adequate     Mental Status:  Alert and awake   Hydration Status:  Euvolemic   PONV Controlled:  None   Airway Patency:  Patent      Post Op Vitals Reviewed: Yes      Staff: CRNA         No notable events documented.     /64 (07/14/23 1427)    Temp 98 °F (36.7 °C) (07/14/23 1427)    Pulse 105 (07/14/23 1427)   Resp 20 (07/14/23 1427)    SpO2 100 % (07/14/23 1427)

## 2023-07-14 NOTE — ANESTHESIA PREPROCEDURE EVALUATION
Procedure:  EGD    Relevant Problems   No relevant active problems        Physical Exam    Airway    Mallampati score: I  TM Distance: >3 FB  Neck ROM: full     Dental       Cardiovascular  Cardiovascular exam normal    Pulmonary  Pulmonary exam normal     Other Findings        Anesthesia Plan  ASA Score- 1     Anesthesia Type- IV sedation with anesthesia with ASA Monitors. Additional Monitors:   Airway Plan:           Plan Factors-Exercise tolerance (METS): >4 METS. Chart reviewed. EKG reviewed. Imaging results reviewed. Existing labs reviewed. Patient summary reviewed. Induction- intravenous. Postoperative Plan- Plan for postoperative opioid use. Planned trial extubation    Informed Consent- Anesthetic plan and risks discussed with patient. I personally reviewed this patient with the CRNA. Discussed and agreed on the Anesthesia Plan with the CRNA. Kaylan Henderson

## 2023-07-19 PROCEDURE — 88342 IMHCHEM/IMCYTCHM 1ST ANTB: CPT | Performed by: STUDENT IN AN ORGANIZED HEALTH CARE EDUCATION/TRAINING PROGRAM

## 2023-07-19 PROCEDURE — 88305 TISSUE EXAM BY PATHOLOGIST: CPT | Performed by: STUDENT IN AN ORGANIZED HEALTH CARE EDUCATION/TRAINING PROGRAM

## 2023-07-21 ENCOUNTER — TREATMENT (OUTPATIENT)
Dept: GASTROENTEROLOGY | Facility: CLINIC | Age: 38
End: 2023-07-21

## 2023-07-21 DIAGNOSIS — R10.13 EPIGASTRIC PAIN: Primary | ICD-10-CM

## 2023-07-21 PROCEDURE — 88112 CYTOPATH CELL ENHANCE TECH: CPT | Performed by: STUDENT IN AN ORGANIZED HEALTH CARE EDUCATION/TRAINING PROGRAM

## 2023-07-24 ENCOUNTER — TELEPHONE (OUTPATIENT)
Dept: GASTROENTEROLOGY | Facility: CLINIC | Age: 38
End: 2023-07-24

## 2023-07-24 ENCOUNTER — TREATMENT (OUTPATIENT)
Dept: GASTROENTEROLOGY | Facility: CLINIC | Age: 38
End: 2023-07-24

## 2023-07-24 DIAGNOSIS — B96.81 HELICOBACTER POSITIVE GASTRITIS: Primary | ICD-10-CM

## 2023-07-24 DIAGNOSIS — K29.70 HELICOBACTER POSITIVE GASTRITIS: Primary | ICD-10-CM

## 2023-07-24 RX ORDER — PANTOPRAZOLE SODIUM 40 MG/1
40 TABLET, DELAYED RELEASE ORAL
Qty: 62 TABLET | Refills: 6 | Status: SHIPPED | OUTPATIENT
Start: 2023-07-24

## 2023-07-24 RX ORDER — AMOXICILLIN 500 MG/1
1000 CAPSULE ORAL EVERY 12 HOURS SCHEDULED
Qty: 56 CAPSULE | Refills: 0 | Status: SHIPPED | OUTPATIENT
Start: 2023-07-24 | End: 2023-08-07

## 2023-07-24 RX ORDER — CIPROFLOXACIN 500 MG/1
500 TABLET, FILM COATED ORAL EVERY 12 HOURS SCHEDULED
Qty: 20 TABLET | Refills: 0 | Status: SHIPPED | OUTPATIENT
Start: 2023-07-24 | End: 2023-08-03

## 2023-07-24 NOTE — TELEPHONE ENCOUNTER
----- Message from Criselda Banks DO sent at 7/21/2023  7:33 AM EDT -----  Please call the patient with the biopsy results. Biopsies of stomach were positive for Helicobacter pylori. The patient will need to be put on quadruple drug therapy which I have sent to the patient's pharmacy. The patient will need to be seen back in the office in 8 weeks after completion of the therapy to perform stool testing for Helicobacter pylori to confirm successful eradication.

## 2023-07-24 NOTE — TELEPHONE ENCOUNTER
----- Message from Elpidio Leo DO sent at 7/21/2023  7:35 AM EDT -----  Also, this patient uses the [address] I believe for most of her medications. I cannot send all for these medications to the [facility] pharmacy. Please find out whether or not this patient is able to receive these prescriptions sent to the [facility] [address] [facility].

## 2023-07-24 NOTE — TELEPHONE ENCOUNTER
----- Message from Vandana Arguello DO sent at 7/24/2023  1:13 PM EDT -----  Please call the patient with the biopsy results. The biopsies were positive for Helicobacter pylori. The patient will need quadruple drug therapy for 2 weeks to treat this and then will need to be seen 8 weeks later to confirm successful eradication of the Helicobacter pylori.

## 2023-07-26 ENCOUNTER — TELEPHONE (OUTPATIENT)
Dept: GASTROENTEROLOGY | Facility: CLINIC | Age: 38
End: 2023-07-26

## 2023-07-26 DIAGNOSIS — B37.81 CANDIDAL ESOPHAGITIS (HCC): Primary | ICD-10-CM

## 2023-07-26 RX ORDER — FLUCONAZOLE 100 MG/1
100 TABLET ORAL DAILY
Qty: 7 TABLET | Refills: 0 | Status: SHIPPED | OUTPATIENT
Start: 2023-07-26 | End: 2023-07-27 | Stop reason: SDUPTHER

## 2023-07-26 NOTE — TELEPHONE ENCOUNTER
Pt called in stating [facility].  Pt requesting script be faxed to [facility] at [phone]

## 2023-07-26 NOTE — TELEPHONE ENCOUNTER
Spoke to pt and relayed results. Pt uses the [place] pharmacy. She is currently as the the [place] at a doctor's appointment and will be waiting for the medication.

## 2023-07-26 NOTE — TELEPHONE ENCOUNTER
----- [facility] sent at 7/26/2023  8:14 AM EDT -----  Please advise patient her esophageal brushing are positive for a fungal infection (candidiasis)  Common in patients who are diabetic or have asthma and use inhalers. She should be treated with a 7 day course of fluconazole.   Please find out which pharmacy she wants this called into

## 2023-07-27 ENCOUNTER — TELEPHONE (OUTPATIENT)
Dept: GASTROENTEROLOGY | Facility: CLINIC | Age: 38
End: 2023-07-27

## 2023-07-27 DIAGNOSIS — B37.81 CANDIDAL ESOPHAGITIS (HCC): ICD-10-CM

## 2023-07-27 RX ORDER — FLUCONAZOLE 100 MG/1
100 TABLET ORAL DAILY
Qty: 7 TABLET | Refills: 0 | Status: SHIPPED | OUTPATIENT
Start: 2023-07-27 | End: 2023-08-03

## 2024-11-05 NOTE — TELEPHONE ENCOUNTER
Call Center TCM Note      Flowsheet Row Responses   [facility] patient discharged from? [place]   Does the patient have one of the following disease processes/diagnoses(primary or secondary)? Other   TCM attempt successful? Yes   Call end time 1258   Discharge diagnosis Chest pain   Is patient permission given to speak with other caregiver? Yes   List who call center can speak with Latricia Tee   Person spoke with today (if not patient) and relationship Latricia Tee- mother   Meds reviewed with patient/caregiver? Yes   Is the patient having any side effects they believe may be caused by any medication additions or changes? No   Does the patient have all medications ordered at discharge? Yes   Is the patient taking all medications as directed (includes completed medication regime)? Yes   Does the patient have an appointment with their PCP within 7-14 days of discharge? No   Nursing Interventions Assisted patient with making appointment per protocol   Has home health visited the patient within 72 hours of discharge? N/A   Psychosocial issues? No   Did the patient receive a copy of their discharge instructions? Yes   Nursing interventions Reviewed instructions with patient   What is the patient's perception of their health status since discharge? Improving   Is the patient/caregiver able to teach back signs and symptoms related to disease process for when to call PCP? Yes   Is the patient/caregiver able to teach back signs and symptoms related to disease process for when to call 911? Yes   Is the patient/caregiver able to teach back the hierarchy of who to call/visit for symptoms/problems? PCP, Specialist, Home health nurse, Urgent Care, ED, 911 Yes   If the patient is a current smoker, are they able to teach back resources for cessation? Not a smoker   TCM call completed? Yes   Call end time 1258   Would this patient benefit from a Referral to [facility] Social Work? No   Is the patient interested in additional  Patients GI provider:  Dr Franklyn Valle    Number to return call: [phone]    Reason for call: Please obtain auth # for [facility] ref is needed      Scheduled procedure/appointment date if applicable: Apt 9/0/48 calls from an ambulatory ? No            Opal Green LPN    11/5/2024, 12:58 EST

## (undated) DEVICE — ENDOPATH XCEL BLADELESS TROCARS WITH STABILITY SLEEVES: Brand: ENDOPATH XCEL

## (undated) DEVICE — GLOVE INDICATOR PI UNDERGLOVE SZ 6.5 BLUE

## (undated) DEVICE — [HIGH FLOW INSUFFLATOR,  DO NOT USE IF PACKAGE IS DAMAGED,  KEEP DRY,  KEEP AWAY FROM SUNLIGHT,  PROTECT FROM HEAT AND RADIOACTIVE SOURCES.]: Brand: PNEUMOSURE

## (undated) DEVICE — ADHESIVE SKN CLSR HISTOACRYL FLEX 0.5ML LF

## (undated) DEVICE — BETHLEHEM UNIVERSAL GYN LAP PK: Brand: CARDINAL HEALTH

## (undated) DEVICE — MAYO STAND COVER: Brand: CONVERTORS

## (undated) DEVICE — HARMONIC ACE 5MM DIAMETER SHEARS 36CM SHAFT LENGTH + ADAPTIVE TISSUE TECHNOLOGY FOR USE WITH GENERATOR G11: Brand: HARMONIC ACE

## (undated) DEVICE — PVC URETHRAL CATHETER: Brand: DOVER

## (undated) DEVICE — TROCAR: Brand: KII FIOS FIRST ENTRY

## (undated) DEVICE — CHLORHEXIDINE 4PCT 4 OZ

## (undated) DEVICE — PENCIL ELECTROSURG E-Z CLEAN -0035H

## (undated) DEVICE — ENDOPOUCH RETRIEVER SPECIMEN RETRIEVAL BAGS: Brand: ENDOPOUCH RETRIEVER

## (undated) DEVICE — 3M™ STERI-STRIP™ REINFORCED ADHESIVE SKIN CLOSURES, R1547, 1/2 IN X 4 IN (12 MM X 100 MM), 6 STRIPS/ENVELOPE: Brand: 3M™ STERI-STRIP™

## (undated) DEVICE — SPONGE 4 X 4 XRAY 16 PLY STRL LF RFD

## (undated) DEVICE — INTENDED FOR TISSUE SEPARATION, AND OTHER PROCEDURES THAT REQUIRE A SHARP SURGICAL BLADE TO PUNCTURE OR CUT.: Brand: BARD-PARKER SAFETY BLADES SIZE 11, STERILE

## (undated) DEVICE — LIGHT HANDLE COVER SLEEVE DISP BLUE STELLAR

## (undated) DEVICE — DRAPE EQUIPMENT RF WAND

## (undated) DEVICE — SCD SEQUENTIAL COMPRESSION COMFORT SLEEVE MEDIUM KNEE LENGTH: Brand: KENDALL SCD

## (undated) DEVICE — CAUTERY TIP POLISHER: Brand: DEVON

## (undated) DEVICE — CHLORAPREP HI-LITE 26ML ORANGE

## (undated) DEVICE — SYRINGE 50ML LL